# Patient Record
Sex: FEMALE | Race: WHITE | Employment: UNEMPLOYED | URBAN - METROPOLITAN AREA
[De-identification: names, ages, dates, MRNs, and addresses within clinical notes are randomized per-mention and may not be internally consistent; named-entity substitution may affect disease eponyms.]

---

## 2019-05-09 ENCOUNTER — APPOINTMENT (OUTPATIENT)
Dept: CT IMAGING | Age: 24
End: 2019-05-09
Payer: MEDICAID

## 2019-05-09 ENCOUNTER — HOSPITAL ENCOUNTER (EMERGENCY)
Age: 24
Discharge: HOME | End: 2019-05-09
Attending: EMERGENCY MEDICINE
Payer: MEDICAID

## 2019-05-09 VITALS
OXYGEN SATURATION: 100 % | RESPIRATION RATE: 16 BRPM | HEIGHT: 65 IN | HEART RATE: 82 BPM | DIASTOLIC BLOOD PRESSURE: 68 MMHG | SYSTOLIC BLOOD PRESSURE: 97 MMHG | BODY MASS INDEX: 27.49 KG/M2 | WEIGHT: 165 LBS | TEMPERATURE: 98 F

## 2019-05-09 DIAGNOSIS — R10.33 PERIUMBILICAL ABDOMINAL PAIN: Primary | ICD-10-CM

## 2019-05-09 DIAGNOSIS — R11.2 NON-INTRACTABLE VOMITING WITH NAUSEA, UNSPECIFIED VOMITING TYPE: ICD-10-CM

## 2019-05-09 LAB
ALBUMIN SERPL-MCNC: 4.4 G/DL (ref 3.5–5.2)
ALP BLD-CCNC: 79 U/L (ref 35–104)
ALT SERPL-CCNC: 19 U/L (ref 0–32)
ANION GAP SERPL CALCULATED.3IONS-SCNC: 8 MMOL/L (ref 7–16)
AST SERPL-CCNC: 18 U/L (ref 0–31)
BACTERIA: ABNORMAL /HPF
BILIRUB SERPL-MCNC: 0.4 MG/DL (ref 0–1.2)
BILIRUBIN URINE: NEGATIVE
BLOOD, URINE: ABNORMAL
BUN BLDV-MCNC: 15 MG/DL (ref 6–20)
CALCIUM SERPL-MCNC: 9.2 MG/DL (ref 8.6–10.2)
CHLORIDE BLD-SCNC: 103 MMOL/L (ref 98–107)
CLARITY: CLEAR
CO2: 27 MMOL/L (ref 22–29)
COLOR: YELLOW
CREAT SERPL-MCNC: 0.8 MG/DL (ref 0.5–1)
EPITHELIAL CELLS, UA: ABNORMAL /HPF
GFR AFRICAN AMERICAN: >60
GFR NON-AFRICAN AMERICAN: >60 ML/MIN/1.73
GLUCOSE BLD-MCNC: 109 MG/DL (ref 74–99)
GLUCOSE URINE: NEGATIVE MG/DL
HCG, URINE, POC: NEGATIVE
HCT VFR BLD CALC: 41.4 % (ref 34–48)
HEMOGLOBIN: 14.3 G/DL (ref 11.5–15.5)
KETONES, URINE: NEGATIVE MG/DL
LACTIC ACID: 0.9 MMOL/L (ref 0.5–2.2)
LEUKOCYTE ESTERASE, URINE: ABNORMAL
LIPASE: 22 U/L (ref 13–60)
Lab: NORMAL
MCH RBC QN AUTO: 28.5 PG (ref 26–35)
MCHC RBC AUTO-ENTMCNC: 34.5 % (ref 32–34.5)
MCV RBC AUTO: 82.5 FL (ref 80–99.9)
NEGATIVE QC PASS/FAIL: NORMAL
NITRITE, URINE: NEGATIVE
PDW BLD-RTO: 11.9 FL (ref 11.5–15)
PH UA: 7 (ref 5–9)
PLATELET # BLD: 248 E9/L (ref 130–450)
PMV BLD AUTO: 9.8 FL (ref 7–12)
POSITIVE QC PASS/FAIL: NORMAL
POTASSIUM REFLEX MAGNESIUM: 4.5 MMOL/L (ref 3.5–5)
PROTEIN UA: ABNORMAL MG/DL
RBC # BLD: 5.02 E12/L (ref 3.5–5.5)
RBC UA: ABNORMAL /HPF (ref 0–2)
SODIUM BLD-SCNC: 138 MMOL/L (ref 132–146)
SPECIFIC GRAVITY UA: 1.02 (ref 1–1.03)
TOTAL PROTEIN: 7.2 G/DL (ref 6.4–8.3)
UROBILINOGEN, URINE: 0.2 E.U./DL
WBC # BLD: 13.3 E9/L (ref 4.5–11.5)
WBC UA: ABNORMAL /HPF (ref 0–5)

## 2019-05-09 PROCEDURE — 36415 COLL VENOUS BLD VENIPUNCTURE: CPT

## 2019-05-09 PROCEDURE — 80053 COMPREHEN METABOLIC PANEL: CPT

## 2019-05-09 PROCEDURE — 83690 ASSAY OF LIPASE: CPT

## 2019-05-09 PROCEDURE — 85027 COMPLETE CBC AUTOMATED: CPT

## 2019-05-09 PROCEDURE — 96375 TX/PRO/DX INJ NEW DRUG ADDON: CPT

## 2019-05-09 PROCEDURE — 83605 ASSAY OF LACTIC ACID: CPT

## 2019-05-09 PROCEDURE — 6360000002 HC RX W HCPCS: Performed by: STUDENT IN AN ORGANIZED HEALTH CARE EDUCATION/TRAINING PROGRAM

## 2019-05-09 PROCEDURE — 6360000004 HC RX CONTRAST MEDICATION: Performed by: RADIOLOGY

## 2019-05-09 PROCEDURE — 96374 THER/PROPH/DIAG INJ IV PUSH: CPT

## 2019-05-09 PROCEDURE — 99284 EMERGENCY DEPT VISIT MOD MDM: CPT

## 2019-05-09 PROCEDURE — 2580000003 HC RX 258: Performed by: STUDENT IN AN ORGANIZED HEALTH CARE EDUCATION/TRAINING PROGRAM

## 2019-05-09 PROCEDURE — 81001 URINALYSIS AUTO W/SCOPE: CPT

## 2019-05-09 PROCEDURE — 74177 CT ABD & PELVIS W/CONTRAST: CPT

## 2019-05-09 RX ORDER — ONDANSETRON 2 MG/ML
4 INJECTION INTRAMUSCULAR; INTRAVENOUS ONCE
Status: COMPLETED | OUTPATIENT
Start: 2019-05-09 | End: 2019-05-09

## 2019-05-09 RX ORDER — IBUPROFEN 800 MG/1
800 TABLET ORAL 2 TIMES DAILY PRN
Qty: 10 TABLET | Refills: 0 | Status: SHIPPED | OUTPATIENT
Start: 2019-05-09 | End: 2021-09-13

## 2019-05-09 RX ORDER — FENTANYL CITRATE 50 UG/ML
50 INJECTION, SOLUTION INTRAMUSCULAR; INTRAVENOUS ONCE
Status: COMPLETED | OUTPATIENT
Start: 2019-05-09 | End: 2019-05-09

## 2019-05-09 RX ORDER — ONDANSETRON 4 MG/1
4 TABLET, ORALLY DISINTEGRATING ORAL EVERY 8 HOURS PRN
Qty: 10 TABLET | Refills: 0 | Status: SHIPPED | OUTPATIENT
Start: 2019-05-09 | End: 2021-07-12

## 2019-05-09 RX ORDER — 0.9 % SODIUM CHLORIDE 0.9 %
1000 INTRAVENOUS SOLUTION INTRAVENOUS ONCE
Status: COMPLETED | OUTPATIENT
Start: 2019-05-09 | End: 2019-05-09

## 2019-05-09 RX ADMIN — SODIUM CHLORIDE 1000 ML: 9 INJECTION, SOLUTION INTRAVENOUS at 09:18

## 2019-05-09 RX ADMIN — ONDANSETRON 4 MG: 2 INJECTION INTRAMUSCULAR; INTRAVENOUS at 09:19

## 2019-05-09 RX ADMIN — IOPAMIDOL 80 ML: 755 INJECTION, SOLUTION INTRAVENOUS at 10:12

## 2019-05-09 RX ADMIN — FENTANYL CITRATE 50 MCG: 50 INJECTION INTRAMUSCULAR; INTRAVENOUS at 09:19

## 2019-05-09 ASSESSMENT — ENCOUNTER SYMPTOMS
EYE REDNESS: 0
CONSTIPATION: 0
NAUSEA: 1
SHORTNESS OF BREATH: 0
TROUBLE SWALLOWING: 0
BLOOD IN STOOL: 0
COUGH: 0
SORE THROAT: 0
DIARRHEA: 0
HEMATOCHEZIA: 0
ABDOMINAL PAIN: 1
VOMITING: 1
BACK PAIN: 0
HEMATEMESIS: 0

## 2019-05-09 ASSESSMENT — PAIN SCALES - GENERAL
PAINLEVEL_OUTOF10: 8
PAINLEVEL_OUTOF10: 1
PAINLEVEL_OUTOF10: 6

## 2019-05-09 ASSESSMENT — PAIN DESCRIPTION - FREQUENCY: FREQUENCY: INTERMITTENT

## 2019-05-09 ASSESSMENT — PAIN DESCRIPTION - LOCATION: LOCATION: ABDOMEN

## 2019-05-09 ASSESSMENT — PAIN DESCRIPTION - DESCRIPTORS: DESCRIPTORS: ACHING

## 2019-05-09 ASSESSMENT — PAIN DESCRIPTION - PAIN TYPE: TYPE: ACUTE PAIN

## 2019-05-09 ASSESSMENT — PAIN DESCRIPTION - ORIENTATION: ORIENTATION: RIGHT

## 2019-05-09 NOTE — ED PROVIDER NOTES
The patient is a 23-year-old female presents to the emergency department complaining of abdominal pain. The patient states her abdominal pain started at 3:00 this morning, and woke her from sleep. Her pain is primarily periumbilical and right lower quadrant. She also has been experiencing nausea and vomiting several times this morning. The patient does not have any history of abdominal surgeries. The patient has a nexplanon, and her last menstrual period was approximately two weeks ago. The patient did notice some spotting this morning. The patient denies any fever, chills, hematemesis, hematochezia, melena, diarrhea, dysuria, hematuria, vaginal discharge,  recent hospitalization, recent illness, or other acute symptoms or concerns. The history is provided by the patient. Abdominal Pain   Pain location:  Periumbilical  Pain quality: aching    Pain radiates to:  Does not radiate  Pain severity:  Mild  Onset quality:  Sudden  Timing:  Constant  Progression:  Unchanged  Chronicity:  New  Context: awakening from sleep    Context: not diet changes, not eating, not previous surgeries, not recent illness, not recent travel, not sick contacts, not suspicious food intake and not trauma    Relieved by:  Nothing  Worsened by:  Nothing  Ineffective treatments:  NSAIDs  Associated symptoms: nausea and vomiting    Associated symptoms: no anorexia, no chest pain, no chills, no constipation, no cough, no diarrhea, no dysuria, no fatigue, no fever, no hematemesis, no hematochezia, no hematuria, no melena, no shortness of breath, no sore throat and no vaginal discharge    Risk factors: no aspirin use, not elderly, has not had multiple surgeries, not obese and no recent hospitalization        Review of Systems   Constitutional: Negative for chills, fatigue and fever. HENT: Negative for sore throat and trouble swallowing. Eyes: Negative for redness and visual disturbance.    Respiratory: Negative for cough and shortness of breath. Cardiovascular: Negative for chest pain and leg swelling. Gastrointestinal: Positive for abdominal pain, nausea and vomiting. Negative for anorexia, blood in stool, constipation, diarrhea, hematemesis, hematochezia and melena. Genitourinary: Negative for dysuria, flank pain, hematuria and vaginal discharge. Musculoskeletal: Negative for back pain. Skin: Negative for rash and wound. Neurological: Negative for dizziness, light-headedness, numbness and headaches. Physical Exam   Constitutional: She is oriented to person, place, and time. She appears well-developed and well-nourished. HENT:   Head: Normocephalic and atraumatic. Eyes: Conjunctivae and EOM are normal.   Neck: Normal range of motion. Neck supple. Cardiovascular: Normal rate, regular rhythm and normal heart sounds. No murmur heard. Pulmonary/Chest: Effort normal and breath sounds normal. No respiratory distress. She has no wheezes. She has no rales. Abdominal: Soft. Bowel sounds are normal. She exhibits no distension. There is tenderness in the right lower quadrant and periumbilical area. There is no rigidity, no rebound and no guarding. Musculoskeletal: She exhibits no edema. Neurological: She is alert and oriented to person, place, and time. No sensory deficit. Coordination normal.   Skin: Skin is warm and dry. Nursing note and vitals reviewed. Procedures    MDM  Number of Diagnoses or Management Options  Non-intractable vomiting with nausea, unspecified vomiting type:   Periumbilical abdominal pain:   Diagnosis management comments: The patient is a 69-year-old female who presents the emergency department complaining of abdominal pain, nausea, and vomiting. On examination the patient has tenderness to palpation of the periumbilical and right lower quadrant regions.   She has mild leukocytosis of 13.3, electrolytes are within normal limits, lipase is normal, urinalysis not evident of any acute infection, lactic acid is within normal limits, pregnancy test negative, CT abdomen is not evident of any acute findings. Treating patient with 1 L of IV fluids, antiemetic, and pain medication. The patient is feeling much better and able to tolerate food and drink in the ED prior to discharge. Discussed in depth with patient the need to follow up with her Bandar Tatyana and family doctor. Recommended her to return to the emergency department if she experiences right lower quadrant localized pain, fever, intractable vomiting, dehydration, or other acute symptoms or concerns. Patient verbalized understanding and agreement to plan of care and discharge instructions.                  --------------------------------------------- PAST HISTORY ---------------------------------------------  Past Medical History:  has a past medical history of Anemia, Asthma, and Postpartum depression. Past Surgical History:  has a past surgical history that includes Eye surgery (1997). Social History:  reports that she quit smoking about 3 years ago. Her smoking use included cigarettes. She has never used smokeless tobacco. She reports that she does not drink alcohol or use drugs. Family History: family history includes Arthritis in her maternal grandmother; Asthma in her maternal grandmother; Cancer in her maternal grandfather; Diabetes in her paternal grandfather; High Blood Pressure in her maternal grandfather; High Cholesterol in her maternal grandmother and paternal grandfather; Nathaneil Larkin / [place] in her maternal grandmother and mother; Stroke in her maternal grandfather and maternal grandmother; Substance Abuse in her maternal grandfather. The patients home medications have been reviewed. Allergies: Patient has no known allergies.     -------------------------------------------------- RESULTS -------------------------------------------------  Labs:  Results for orders placed or performed during the hospital encounter of 05/09/19   Urinalysis   Result Value Ref Range    Color, UA Yellow Straw/Yellow    Clarity, UA Clear Clear    Glucose, Ur Negative Negative mg/dL    Bilirubin Urine Negative Negative    Ketones, Urine Negative Negative mg/dL    Specific Gravity, UA 1.020 1.005 - 1.030    Blood, Urine LARGE (A) Negative    pH, UA 7.0 5.0 - 9.0    Protein, UA TRACE Negative mg/dL    Urobilinogen, Urine 0.2 <2.0 E.U./dL    Nitrite, Urine Negative Negative    Leukocyte Esterase, Urine TRACE (A) Negative   CBC   Result Value Ref Range    WBC 13.3 (H) 4.5 - 11.5 E9/L    RBC 5.02 3.50 - 5.50 E12/L    Hemoglobin 14.3 11.5 - 15.5 g/dL    Hematocrit 41.4 34.0 - 48.0 %    MCV 82.5 80.0 - 99.9 fL    MCH 28.5 26.0 - 35.0 pg    MCHC 34.5 32.0 - 34.5 %    RDW 11.9 11.5 - 15.0 fL    Platelets 145 764 - 297 E9/L    MPV 9.8 7.0 - 12.0 fL   Comprehensive Metabolic Panel w/ Reflex to MG   Result Value Ref Range    Sodium 138 132 - 146 mmol/L    Potassium reflex Magnesium 4.5 3.5 - 5.0 mmol/L    Chloride 103 98 - 107 mmol/L    CO2 27 22 - 29 mmol/L    Anion Gap 8 7 - 16 mmol/L    Glucose 109 (H) 74 - 99 mg/dL    BUN 15 6 - 20 mg/dL    CREATININE 0.8 0.5 - 1.0 mg/dL    GFR Non-African American >60 >=60 mL/min/1.73    GFR African American >60     Calcium 9.2 8.6 - 10.2 mg/dL    Total Protein 7.2 6.4 - 8.3 g/dL    Alb 4.4 3.5 - 5.2 g/dL    Total Bilirubin 0.4 0.0 - 1.2 mg/dL    Alkaline Phosphatase 79 35 - 104 U/L    ALT 19 0 - 32 U/L    AST 18 0 - 31 U/L   Lactic Acid, Plasma   Result Value Ref Range    Lactic Acid 0.9 0.5 - 2.2 mmol/L   Lipase   Result Value Ref Range    Lipase 22 13 - 60 U/L   Microscopic Urinalysis   Result Value Ref Range    WBC, UA 2-5 0 - 5 /HPF    RBC, UA 5-10 (A) 0 - 2 /HPF    Epi Cells RARE /HPF    Bacteria, UA FEW (A) /HPF   POC Pregnancy Urine Qual   Result Value Ref Range    HCG, Urine, POC Negative Negative    Lot Number [id number]     Positive QC Pass/Fail Pass     Negative QC Pass/Fail Pass Radiology:  CT ABDOMEN PELVIS W IV CONTRAST Additional Contrast? None   Final Result   No acute finding.          ------------------------- NURSING NOTES AND VITALS REVIEWED ---------------------------  Date / Time Roomed:  5/9/2019  8:50 AM  ED Bed Assignment:  16/16    The nursing notes within the ED encounter and vital signs as below have been reviewed. BP 97/68   Pulse 82   Temp 98 °F (36.7 °C) (Oral)   Resp 16   Ht 5' 5\" (1.651 m)   Wt 165 lb (74.8 kg)   LMP 04/25/2019 (Approximate)   SpO2 100%   BMI 27.46 kg/m²   Oxygen Saturation Interpretation: Normal      ------------------------------------------ PROGRESS NOTES ------------------------------------------  12:25 PM  I have spoken with the patient and discussed todays results, in addition to providing specific details for the plan of care and counseling regarding the diagnosis and prognosis. Their questions are answered at this time and they are agreeable with the plan. I discussed at length with them reasons for immediate return here for re evaluation. They will followup with their primary care physician by calling their office tomorrow. --------------------------------- ADDITIONAL PROVIDER NOTES ---------------------------------  At this time the patient is without objective evidence of an acute process requiring hospitalization or inpatient management. They have remained hemodynamically stable throughout their entire ED visit and are stable for discharge with outpatient follow-up. The plan has been discussed in detail and they are aware of the specific conditions for emergent return, as well as the importance of follow-up.       Discharge Medication List as of 5/9/2019 10:57 AM      START taking these medications    Details   ibuprofen (ADVIL;MOTRIN) 800 MG tablet Take 1 tablet by mouth 2 times daily as needed for Pain, Disp-10 tablet, R-0Print      ondansetron (ZOFRAN ODT) 4 MG disintegrating tablet Take 1 tablet by mouth every 8 hours as needed for Nausea or Vomiting, Disp-10 tablet, R-0Print             Diagnosis:  1. Periumbilical abdominal pain    2. Non-intractable vomiting with nausea, unspecified vomiting type        Disposition:  Patient's disposition: Discharge to home  Patient's condition is stable.            Aramis Campos,   Resident  05/09/19 7641

## 2021-04-02 ENCOUNTER — HOSPITAL ENCOUNTER (EMERGENCY)
Age: 26
Discharge: HOME | End: 2021-04-02
Attending: EMERGENCY MEDICINE
Payer: MEDICAID

## 2021-04-02 ENCOUNTER — APPOINTMENT (OUTPATIENT)
Dept: ULTRASOUND IMAGING | Age: 26
End: 2021-04-02
Payer: MEDICAID

## 2021-04-02 VITALS
OXYGEN SATURATION: 98 % | SYSTOLIC BLOOD PRESSURE: 96 MMHG | BODY MASS INDEX: 30.12 KG/M2 | WEIGHT: 170 LBS | RESPIRATION RATE: 18 BRPM | DIASTOLIC BLOOD PRESSURE: 55 MMHG | TEMPERATURE: 97.8 F | HEIGHT: 63 IN | HEART RATE: 94 BPM

## 2021-04-02 DIAGNOSIS — O20.0 THREATENED MISCARRIAGE IN EARLY PREGNANCY: Primary | ICD-10-CM

## 2021-04-02 DIAGNOSIS — O20.9 VAGINAL BLEEDING BEFORE 22 WEEKS GESTATION: ICD-10-CM

## 2021-04-02 DIAGNOSIS — O20.9 BLEEDING IN EARLY PREGNANCY: ICD-10-CM

## 2021-04-02 LAB
ABO/RH: NORMAL
ALBUMIN SERPL-MCNC: 3.8 G/DL (ref 3.5–5.2)
ALP BLD-CCNC: 53 U/L (ref 35–104)
ALT SERPL-CCNC: 29 U/L (ref 0–32)
ANION GAP SERPL CALCULATED.3IONS-SCNC: 11 MMOL/L (ref 7–16)
ANTIBODY SCREEN: NORMAL
AST SERPL-CCNC: 17 U/L (ref 0–31)
BACTERIA: NORMAL /HPF
BASOPHILS ABSOLUTE: 0.02 E9/L (ref 0–0.2)
BASOPHILS RELATIVE PERCENT: 0.3 % (ref 0–2)
BILIRUB SERPL-MCNC: <0.2 MG/DL (ref 0–1.2)
BILIRUBIN URINE: NEGATIVE
BLOOD, URINE: ABNORMAL
BUN BLDV-MCNC: 8 MG/DL (ref 6–20)
CALCIUM SERPL-MCNC: 8.7 MG/DL (ref 8.6–10.2)
CHLORIDE BLD-SCNC: 104 MMOL/L (ref 98–107)
CLARITY: CLEAR
CO2: 23 MMOL/L (ref 22–29)
COLOR: YELLOW
CREAT SERPL-MCNC: 0.6 MG/DL (ref 0.5–1)
EOSINOPHILS ABSOLUTE: 0.11 E9/L (ref 0.05–0.5)
EOSINOPHILS RELATIVE PERCENT: 1.6 % (ref 0–6)
GFR AFRICAN AMERICAN: >60
GFR NON-AFRICAN AMERICAN: >60 ML/MIN/1.73
GLUCOSE BLD-MCNC: 87 MG/DL (ref 74–99)
GLUCOSE URINE: NEGATIVE MG/DL
GONADOTROPIN, CHORIONIC (HCG) QUANT: ABNORMAL MIU/ML
HCT VFR BLD CALC: 33.4 % (ref 34–48)
HEMOGLOBIN: 11.4 G/DL (ref 11.5–15.5)
IMMATURE GRANULOCYTES #: 0.02 E9/L
IMMATURE GRANULOCYTES %: 0.3 % (ref 0–5)
KETONES, URINE: NEGATIVE MG/DL
LEUKOCYTE ESTERASE, URINE: NEGATIVE
LYMPHOCYTES ABSOLUTE: 1.53 E9/L (ref 1.5–4)
LYMPHOCYTES RELATIVE PERCENT: 22 % (ref 20–42)
MCH RBC QN AUTO: 29 PG (ref 26–35)
MCHC RBC AUTO-ENTMCNC: 34.1 % (ref 32–34.5)
MCV RBC AUTO: 85 FL (ref 80–99.9)
MONOCYTES ABSOLUTE: 0.49 E9/L (ref 0.1–0.95)
MONOCYTES RELATIVE PERCENT: 7.1 % (ref 2–12)
NEUTROPHILS ABSOLUTE: 4.78 E9/L (ref 1.8–7.3)
NEUTROPHILS RELATIVE PERCENT: 68.7 % (ref 43–80)
NITRITE, URINE: NEGATIVE
PDW BLD-RTO: 12.6 FL (ref 11.5–15)
PH UA: 6.5 (ref 5–9)
PLATELET # BLD: 200 E9/L (ref 130–450)
PMV BLD AUTO: 9.9 FL (ref 7–12)
POTASSIUM SERPL-SCNC: 3.6 MMOL/L (ref 3.5–5)
PROTEIN UA: NEGATIVE MG/DL
RBC # BLD: 3.93 E12/L (ref 3.5–5.5)
RBC UA: NORMAL /HPF (ref 0–2)
RHIG LOT NUMBER: NORMAL
SODIUM BLD-SCNC: 138 MMOL/L (ref 132–146)
SPECIFIC GRAVITY UA: 1.02 (ref 1–1.03)
TOTAL PROTEIN: 6.3 G/DL (ref 6.4–8.3)
UROBILINOGEN, URINE: 0.2 E.U./DL
WBC # BLD: 7 E9/L (ref 4.5–11.5)
WBC UA: NORMAL /HPF (ref 0–5)

## 2021-04-02 PROCEDURE — 99285 EMERGENCY DEPT VISIT HI MDM: CPT

## 2021-04-02 PROCEDURE — 2580000003 HC RX 258: Performed by: EMERGENCY MEDICINE

## 2021-04-02 PROCEDURE — 86850 RBC ANTIBODY SCREEN: CPT

## 2021-04-02 PROCEDURE — 96372 THER/PROPH/DIAG INJ SC/IM: CPT

## 2021-04-02 PROCEDURE — 85025 COMPLETE CBC W/AUTO DIFF WBC: CPT

## 2021-04-02 PROCEDURE — 76802 OB US < 14 WKS ADDL FETUS: CPT

## 2021-04-02 PROCEDURE — 36415 COLL VENOUS BLD VENIPUNCTURE: CPT

## 2021-04-02 PROCEDURE — 76815 OB US LIMITED FETUS(S): CPT

## 2021-04-02 PROCEDURE — 81001 URINALYSIS AUTO W/SCOPE: CPT

## 2021-04-02 PROCEDURE — 84702 CHORIONIC GONADOTROPIN TEST: CPT

## 2021-04-02 PROCEDURE — 86901 BLOOD TYPING SEROLOGIC RH(D): CPT

## 2021-04-02 PROCEDURE — 86900 BLOOD TYPING SEROLOGIC ABO: CPT

## 2021-04-02 PROCEDURE — 80053 COMPREHEN METABOLIC PANEL: CPT

## 2021-04-02 PROCEDURE — 6370000000 HC RX 637 (ALT 250 FOR IP): Performed by: EMERGENCY MEDICINE

## 2021-04-02 PROCEDURE — 6360000002 HC RX W HCPCS: Performed by: EMERGENCY MEDICINE

## 2021-04-02 RX ORDER — 0.9 % SODIUM CHLORIDE 0.9 %
1000 INTRAVENOUS SOLUTION INTRAVENOUS ONCE
Status: COMPLETED | OUTPATIENT
Start: 2021-04-02 | End: 2021-04-02

## 2021-04-02 RX ORDER — ACETAMINOPHEN 325 MG/1
650 TABLET ORAL ONCE
Status: COMPLETED | OUTPATIENT
Start: 2021-04-02 | End: 2021-04-02

## 2021-04-02 RX ADMIN — SODIUM CHLORIDE 1000 ML: 9 INJECTION, SOLUTION INTRAVENOUS at 05:37

## 2021-04-02 RX ADMIN — ACETAMINOPHEN 650 MG: 325 TABLET, FILM COATED ORAL at 06:45

## 2021-04-02 RX ADMIN — HUMAN RHO(D) IMMUNE GLOBULIN 300 MCG: 300 INJECTION, SOLUTION INTRAMUSCULAR at 07:30

## 2021-04-02 ASSESSMENT — ENCOUNTER SYMPTOMS
SINUS PRESSURE: 0
ABDOMINAL DISTENTION: 0
SHORTNESS OF BREATH: 0
BACK PAIN: 0
NAUSEA: 0
SORE THROAT: 0
COUGH: 0
VOMITING: 0
DIARRHEA: 0
EYE DISCHARGE: 0
EYE REDNESS: 0
WHEEZING: 0
EYE PAIN: 0

## 2021-04-02 ASSESSMENT — PAIN DESCRIPTION - DESCRIPTORS: DESCRIPTORS: CRAMPING

## 2021-04-02 NOTE — ED NOTES
Pt transported to [address] [place] [address] [address] at this time via cart.       America Leija, MARIA T  04/02/21 0379

## 2021-04-02 NOTE — ED NOTES
U/S called. Spouses are not to be in room. Tech. Had not been called in for 0630. Pt. Under 12 weeks. Bladder must be full. Pitcher of water given to pt. Informed both pt. And spouse to call RN when pt. Needs to void.      Edy Langston RN  04/02/21 4704

## 2021-04-02 NOTE — ED NOTES
U/S aware of test.  Will be here around 0630. Pt. Aware.  requests being in room for ultrasound. Will relay to magan.      Prema Austin RN  04/02/21 2375

## 2021-04-02 NOTE — ED PROVIDER NOTES
Patient is a 32 y/o female who presents to the ED with vaginal bleeding. Patient states that she had onset of vaginal bleeding approximately one hour prior to arrival. She states that she soaked a panty liner with blood, however, it has since slowed down. She denies any passage of blood clots. She denies any abdominal pain. Patient is currently approximately 12 weeks pregnant with a twin gestation. This is her fourth pregnancy with no previous miscarriages. Review of Systems   Constitutional: Negative for chills and fever. HENT: Negative for ear pain, sinus pressure and sore throat. Eyes: Negative for pain, discharge and redness. Respiratory: Negative for cough, shortness of breath and wheezing. Cardiovascular: Negative for chest pain. Gastrointestinal: Negative for abdominal distention, diarrhea, nausea and vomiting. Genitourinary: Positive for vaginal bleeding. Negative for dysuria and frequency. Musculoskeletal: Negative for arthralgias and back pain. Skin: Negative for rash and wound. Neurological: Negative for weakness and headaches. Hematological: Negative for adenopathy. All other systems reviewed and are negative. Physical Exam  Vitals signs and nursing note reviewed. Constitutional:       General: She is not in acute distress. HENT:      Head: Normocephalic and atraumatic. Right Ear: External ear normal.      Left Ear: External ear normal.      Nose: Nose normal.      Mouth/Throat:      Mouth: Mucous membranes are moist.   Eyes:      Conjunctiva/sclera: Conjunctivae normal.      Pupils: Pupils are equal, round, and reactive to light. Neck:      Musculoskeletal: Normal range of motion and neck supple. Cardiovascular:      Rate and Rhythm: Regular rhythm. Tachycardia present. Heart sounds: No murmur. Pulmonary:      Effort: Pulmonary effort is normal. No respiratory distress. Breath sounds: Normal breath sounds. No stridor.  No wheezing, rhonchi or rales. Abdominal:      General: Bowel sounds are normal. There is no distension. Palpations: Abdomen is soft. Tenderness: There is no abdominal tenderness. There is no guarding. Musculoskeletal: Normal range of motion. General: No swelling or tenderness. Right lower leg: Normal. She exhibits no tenderness and no swelling. No edema. Left lower leg: Normal. She exhibits no tenderness and no swelling. No edema. Skin:     General: Skin is warm and dry. Neurological:      Mental Status: She is alert and oriented to person, place, and time. Procedures     MDM     ED Course as of Apr 02 0955 Fri Apr 02, 2021 0703 7:03 AM EDT  I received this patient at sign out from Dr. Eusebia Ackerman. I have discussed the patient's initial exam, treatment and plan of care with the out going physician. I have introduced my self to the patient / family and have answered their questions to this point. I have examined the patient myself and reviewed ordered tests / medications and  reviewed any available results to this point. If a resident is involved in the Emergency Department care, I have discussed my findings and plan with them as well. [NC]   [id number] Patient laying in the bed in no distress,  at bedside. She states she is feeling well, hungry and ready to go home. Reports the previous bleeding has significantly proved and pretty much resolved. Updated ultrasound results, they already knew they are having twins and have high risk follow-up and will call her OB/GYN on Monday. [NC]      ED Course User Index  [NC] Craig Clemens DO          7:00 AM EDT  I have signed this patient out to the oncoming physician, Dr. Louis Jacobson.  I have discussed the patient's initial exam, treatment and plan of care with the on coming physician. I have notified the patient / family of the change in treating physician and answered their questions to this point.       ED Course as of Apr 02 0956 The patients home medications have been reviewed. Allergies: Patient has no known allergies.     -------------------------------------------------- RESULTS -------------------------------------------------  Labs:  Results for orders placed or performed during the hospital encounter of 04/02/21   CBC auto differential   Result Value Ref Range    WBC 7.0 4.5 - 11.5 E9/L    RBC 3.93 3.50 - 5.50 E12/L    Hemoglobin 11.4 (L) 11.5 - 15.5 g/dL    Hematocrit 33.4 (L) 34.0 - 48.0 %    MCV 85.0 80.0 - 99.9 fL    MCH 29.0 26.0 - 35.0 pg    MCHC 34.1 32.0 - 34.5 %    RDW 12.6 11.5 - 15.0 fL    Platelets 620 655 - 935 E9/L    MPV 9.9 7.0 - 12.0 fL    Neutrophils % 68.7 43.0 - 80.0 %    Immature Granulocytes % 0.3 0.0 - 5.0 %    Lymphocytes % 22.0 20.0 - 42.0 %    Monocytes % 7.1 2.0 - 12.0 %    Eosinophils % 1.6 0.0 - 6.0 %    Basophils % 0.3 0.0 - 2.0 %    Neutrophils Absolute 4.78 1.80 - 7.30 E9/L    Immature Granulocytes # 0.02 E9/L    Lymphocytes Absolute 1.53 1.50 - 4.00 E9/L    Monocytes Absolute 0.49 0.10 - 0.95 E9/L    Eosinophils Absolute 0.11 0.05 - 0.50 E9/L    Basophils Absolute 0.02 0.00 - 0.20 E9/L   Comprehensive Metabolic Panel   Result Value Ref Range    Sodium 138 132 - 146 mmol/L    Potassium 3.6 3.5 - 5.0 mmol/L    Chloride 104 98 - 107 mmol/L    CO2 23 22 - 29 mmol/L    Anion Gap 11 7 - 16 mmol/L    Glucose 87 74 - 99 mg/dL    BUN 8 6 - 20 mg/dL    CREATININE 0.6 0.5 - 1.0 mg/dL    GFR Non-African American >60 >=60 mL/min/1.73    GFR African American >60     Calcium 8.7 8.6 - 10.2 mg/dL    Total Protein 6.3 (L) 6.4 - 8.3 g/dL    Albumin 3.8 3.5 - 5.2 g/dL    Total Bilirubin <0.2 0.0 - 1.2 mg/dL    Alkaline Phosphatase 53 35 - 104 U/L    ALT 29 0 - 32 U/L    AST 17 0 - 31 U/L   hCG, quantitative, pregnancy   Result Value Ref Range    hCG Quant 56234.0 (H) <10 mIU/mL   Urinalysis   Result Value Ref Range    Color, UA Yellow Straw/Yellow    Clarity, UA Clear Clear    Glucose, Ur Negative Negative mg/dL Bilirubin Urine Negative Negative    Ketones, Urine Negative Negative mg/dL    Specific Gravity, UA 1.025 1.005 - 1.030    Blood, Urine MODERATE (A) Negative    pH, UA 6.5 5.0 - 9.0    Protein, UA Negative Negative mg/dL    Urobilinogen, Urine 0.2 <2.0 E.U./dL    Nitrite, Urine Negative Negative    Leukocyte Esterase, Urine Negative Negative   Microscopic Urinalysis   Result Value Ref Range    WBC, UA 0-1 0 - 5 /HPF    RBC, UA 0-1 0 - 2 /HPF    Bacteria, UA NONE SEEN None Seen /HPF   RH IMMUNE GLOBULIN   Result Value Ref Range    RHIG LOT NUMBER       RhImmuneGlobulin    [id number]          issued       1 vial  04/02/21 07:21   ABO/RH   Result Value Ref Range    ABO/Rh A NEG    LAKIA TEST, INDIRECT, QUALITATIVE   Result Value Ref Range    Antibody Screen NEG        Radiology:  US OB 1 OR MORE FETUS LIMITED   Final Result   1. Presence of a monochorionic/monoamniotic viable intrauterine pregnancy   with 11 weeks and 6 days of development for the Twin A, and the 12 weeks and   2 days of development for the twin B.      2.  Fetal heart activity: Twin A 161 BPM; twin B 153 BPM.      3.  Single anterior placenta not covering the internal cervical os. US OB LESS THAN 14 WEEKS ADDITIONAL FETUS   Final Result   1. Presence of a monochorionic/monoamniotic viable intrauterine pregnancy   with 11 weeks and 6 days of development for the Twin A, and the 12 weeks and   2 days of development for the twin B.      2.  Fetal heart activity: Twin A 161 BPM; twin B 153 BPM.      3.  Single anterior placenta not covering the internal cervical os.             ------------------------- NURSING NOTES AND VITALS REVIEWED ---------------------------  Date / Time Roomed:  4/2/2021  4:51 AM  ED Bed Assignment:  09/09    The nursing notes within the ED encounter and vital signs as below have been reviewed.    BP (!) 96/55   Pulse 94   Temp 97.8 °F (36.6 °C) (Oral)   Resp 18   Ht 5' 3\" (1.6 m)   Wt 170 lb (77.1 kg)   LMP 12/26/2020 (Exact Date)   SpO2 98%   BMI 30.11 kg/m²   Oxygen Saturation Interpretation: Normal      ------------------------------------------ PROGRESS NOTES ------------------------------------------  I have spoken with the patient and discussed todays results, in addition to providing specific details for the plan of care and counseling regarding the diagnosis and prognosis. Their questions are answered at this time and they are agreeable with the plan. I discussed at length with them reasons for immediate return here for re evaluation. They will followup with primary care by calling their office tomorrow. --------------------------------- ADDITIONAL PROVIDER NOTES ---------------------------------  At this time the patient is without objective evidence of an acute process requiring hospitalization or inpatient management. They have remained hemodynamically stable throughout their entire ED visit and are stable for discharge with outpatient follow-up. The plan has been discussed in detail and they are aware of the specific conditions for emergent return, as well as the importance of follow-up. New Prescriptions    No medications on file       Diagnosis:  1. Threatened miscarriage in early pregnancy    2. Bleeding in early pregnancy    3. Vaginal bleeding before 22 weeks gestation        Disposition:  Patient's disposition: Discharge to home  Patient's condition is stable.          [facility], DO  04/02/21 4480

## 2021-04-19 ENCOUNTER — INITIAL PRENATAL (OUTPATIENT)
Dept: OBGYN CLINIC | Age: 26
End: 2021-04-19
Payer: MEDICAID

## 2021-04-19 ENCOUNTER — ANCILLARY PROCEDURE (OUTPATIENT)
Dept: OBGYN CLINIC | Age: 26
End: 2021-04-19
Payer: MEDICAID

## 2021-04-19 VITALS
BODY MASS INDEX: 30.11 KG/M2 | DIASTOLIC BLOOD PRESSURE: 76 MMHG | SYSTOLIC BLOOD PRESSURE: 114 MMHG | HEART RATE: 80 BPM | WEIGHT: 170 LBS

## 2021-04-19 DIAGNOSIS — O30.042 DICHORIONIC DIAMNIOTIC TWIN PREGNANCY IN SECOND TRIMESTER: Primary | ICD-10-CM

## 2021-04-19 LAB
GLUCOSE URINE, POC: NORMAL
PROTEIN UA: NEGATIVE

## 2021-04-19 PROCEDURE — 99213 OFFICE O/P EST LOW 20 MIN: CPT | Performed by: OBSTETRICS & GYNECOLOGY

## 2021-04-19 PROCEDURE — 81002 URINALYSIS NONAUTO W/O SCOPE: CPT | Performed by: OBSTETRICS & GYNECOLOGY

## 2021-04-19 PROCEDURE — 76810 OB US >/= 14 WKS ADDL FETUS: CPT | Performed by: OBSTETRICS & GYNECOLOGY

## 2021-04-19 PROCEDURE — 99203 OFFICE O/P NEW LOW 30 MIN: CPT

## 2021-04-19 PROCEDURE — 76805 OB US >/= 14 WKS SNGL FETUS: CPT | Performed by: OBSTETRICS & GYNECOLOGY

## 2021-04-19 RX ORDER — ASPIRIN 81 MG/1
81 TABLET, CHEWABLE ORAL DAILY
COMMUNITY
End: 2022-02-05 | Stop reason: ALTCHOICE

## 2021-04-19 NOTE — PROGRESS NOTES
Patient is here today for OB new patient. BRONSON 10/15/2021, TWINS. Patient states no current issues. Patient denies vaginal bleeding, discharge and abdominal cramping.

## 2021-05-19 ENCOUNTER — ROUTINE PRENATAL (OUTPATIENT)
Dept: OBGYN CLINIC | Age: 26
End: 2021-05-19
Payer: MEDICAID

## 2021-05-19 ENCOUNTER — ANCILLARY PROCEDURE (OUTPATIENT)
Dept: OBGYN CLINIC | Age: 26
End: 2021-05-19
Payer: MEDICAID

## 2021-05-19 VITALS
WEIGHT: 174.4 LBS | SYSTOLIC BLOOD PRESSURE: 101 MMHG | HEART RATE: 104 BPM | DIASTOLIC BLOOD PRESSURE: 75 MMHG | BODY MASS INDEX: 30.89 KG/M2

## 2021-05-19 DIAGNOSIS — O35.AXX1 PREGNANCY COMPLICATED BY FETAL CLEFT LIP, FETUS 1: ICD-10-CM

## 2021-05-19 DIAGNOSIS — Z3A.18 18 WEEKS GESTATION OF PREGNANCY: ICD-10-CM

## 2021-05-19 DIAGNOSIS — F12.10 MILD TETRAHYDROCANNABINOL (THC) ABUSE: ICD-10-CM

## 2021-05-19 DIAGNOSIS — O30.042 DICHORIONIC DIAMNIOTIC TWIN PREGNANCY IN SECOND TRIMESTER: Primary | ICD-10-CM

## 2021-05-19 PROBLEM — O30.049 DICHORIONIC DIAMNIOTIC TWIN GESTATION: Status: ACTIVE | Noted: 2021-05-19

## 2021-05-19 LAB
GLUCOSE URINE, POC: NORMAL
PROTEIN UA: POSITIVE

## 2021-05-19 PROCEDURE — G8427 DOCREV CUR MEDS BY ELIG CLIN: HCPCS | Performed by: OBSTETRICS & GYNECOLOGY

## 2021-05-19 PROCEDURE — G8419 CALC BMI OUT NRM PARAM NOF/U: HCPCS | Performed by: OBSTETRICS & GYNECOLOGY

## 2021-05-19 PROCEDURE — 99213 OFFICE O/P EST LOW 20 MIN: CPT | Performed by: OBSTETRICS & GYNECOLOGY

## 2021-05-19 PROCEDURE — 76812 OB US DETAILED ADDL FETUS: CPT | Performed by: OBSTETRICS & GYNECOLOGY

## 2021-05-19 PROCEDURE — 81002 URINALYSIS NONAUTO W/O SCOPE: CPT | Performed by: OBSTETRICS & GYNECOLOGY

## 2021-05-19 PROCEDURE — 76817 TRANSVAGINAL US OBSTETRIC: CPT | Performed by: OBSTETRICS & GYNECOLOGY

## 2021-05-19 PROCEDURE — 1036F TOBACCO NON-USER: CPT | Performed by: OBSTETRICS & GYNECOLOGY

## 2021-05-19 PROCEDURE — 76811 OB US DETAILED SNGL FETUS: CPT | Performed by: OBSTETRICS & GYNECOLOGY

## 2021-05-19 RX ORDER — METOCLOPRAMIDE 10 MG/1
TABLET ORAL
Status: ON HOLD | COMMUNITY
Start: 2021-05-18 | End: 2021-10-03 | Stop reason: HOSPADM

## 2021-05-19 RX ORDER — FAMOTIDINE 20 MG/1
TABLET, FILM COATED ORAL
COMMUNITY
Start: 2021-05-18 | End: 2022-02-05 | Stop reason: ALTCHOICE

## 2021-05-19 NOTE — PATIENT INSTRUCTIONS
calcium. If you are worried that you are not getting enough, talk to your doctor. · Exercise every day, and stretch your legs before bed. · Take a warm bath before bed, and try leg warmers at night. Where can you learn more? Go to https://raheem.healthDevotee. org and sign in to your [employer] account. Enter T256 in the [facility] box to learn more about \"Weeks 18 to 22 of Your Pregnancy: Care Instructions. \"     If you do not have an account, please click on the \"Sign Up Now\" link. Current as of: October 8, 2020               Content Version: 12.8  © 2673-6024 [employer]. Care instructions adapted under license by [facility] ([facility]). If you have questions about a medical condition or this instruction, always ask your healthcare professional. [place] [address] any warranty or liability for your use of this information. Patient Education        Learning About When to Call Your Doctor During Pregnancy (Up to 20 Weeks)  Your Care Instructions     It's common to have concerns about what might be a problem during pregnancy. Although most pregnant women don't have any serious problems, it's important to know when to call your doctor if you have certain symptoms. These are general suggestions. Your doctor may give you some more information about when to call. When to call your doctor (up to 20 weeks)  Call 911 anytime you think you may need emergency care. For example, call if:  · You passed out (lost consciousness). Call your doctor now or seek immediate medical care if:  · You have a fever. · You have vaginal bleeding. · You are dizzy or lightheaded, or you feel like you may faint. · You have symptoms of a urinary tract infection. These may include:  ? Pain or burning when you urinate. ? A frequent need to urinate without being able to pass much urine.   ? Pain in the flank, which is just below the rib cage and above the waist on either side of the back.  ? Blood in your urine. · You have belly pain. · You think you are having contractions. · You have a sudden release of fluid from your vagina. Watch closely for changes in your health, and be sure to contact your doctor if:  · You have vaginal discharge that smells bad. · You have other concerns about your pregnancy. Follow-up care is a key part of your treatment and safety. Be sure to make and go to all appointments, and call your doctor if you are having problems. It's also a good idea to know your test results and keep a list of the medicines you take. Where can you learn more? Go to https://iMovepepiceweb.[email or web address] org and sign in to your [employer] account. Enter Y317 in the [employer] box to learn more about \"Learning About When to Call Your Doctor During Pregnancy (Up to 20 Weeks). \"     If you do not have an account, please click on the \"Sign Up Now\" link. Current as of: October 8, 2020               Content Version: 12.8  © 2006-2021 [employer]. Care instructions adapted under license by [facility] ([facility]). If you have questions about a medical condition or this instruction, always ask your healthcare professional. [place] [address] any warranty or liability for your use of this information.

## 2021-05-19 NOTE — PROGRESS NOTES
Pt c/o pain to \"belly button\", swelling to bilateral legs x 2 days. Denies any headaches, blurred vision, epigastric pain, urinary tract discomfort or increased urinary frequency, or signs of labor: abdominal tenderness, bleeding, leaking of fluids, contractions, or decreased fetal movement. Plan of care discussed with pt by provider.

## 2021-05-19 NOTE — PROGRESS NOTES
Diabetes Paternal Grandfather     High Cholesterol Paternal Grandfather        MEDS:   Current Outpatient Rx   Medication Sig Dispense Refill    famotidine (PEPCID) 20 MG tablet TAKE 1 TABLET BY MOUTH TWICE DAILY AS NEEDED      metoclopramide (REGLAN) 10 MG tablet TAKE 1 TABLET BY MOUTH FOUR TIMES DAILY AS NEEDED      MAGNESIUM-OXIDE 400 (241.3 Mg) MG TABS tablet PRN      aspirin 81 MG chewable tablet Take 81 mg by mouth daily      Prenatal Multivit-Min-Fe-FA (PRE-CARLEEN FORMULA) TABS Take 1 tablet by mouth      ibuprofen (ADVIL;MOTRIN) 800 MG tablet Take 1 tablet by mouth 2 times daily as needed for Pain (Patient not taking: Reported on 2021) 10 tablet 0    ondansetron (ZOFRAN ODT) 4 MG disintegrating tablet Take 1 tablet by mouth every 8 hours as needed for Nausea or Vomiting (Patient not taking: Reported on 2021) 10 tablet 0    naproxen (NAPROSYN) 500 MG tablet Take 1 tablet by mouth 2 times daily (Patient not taking: Reported on 2021) 30 tablet 0    HYDROcodone-acetaminophen (NORCO) 5-325 MG per tablet Take 1 tablet by mouth every 6 hours as needed for Pain . (Patient not taking: Reported on 2021) 8 tablet 0    CRANBERRY PO Take by mouth (Patient not taking: Reported on 2021)         ALLERGY:   No Known Allergies  Review of the systems:  Leslee Ferreira denies fever, chills, headaches, visual changes, stuffy/running nose, sore throat, chest pain, heart palpitations, edema, pain with breathing, shortness of breath, nausea or vomiting, difficult or painful urination, joint or muscle pain, numbness, tingling, tremors, and/or seizures. Physical Exam  Vitals signs and nursing note reviewed.  /75   Pulse 104   Wt 174 lb 6.4 oz (79.1 kg)   LMP 2020 (Exact Date)   BMI 30.89 kg/m²   Constitutional: Appearance: She is not in distress  Heart rate is regular  Neck is supple with normal range of motion  No respiratory distress  Abdomen is soft  Neurological:      General: No focal deficit present. Mental Status: She is alert and oriented to person, place, and time. No calf tenderness  Psychiatric:         Mood and Affect: Mood normal.         Behavior: Behavior normal.    Fetal heart tones: Positive    IMAGING:  Please see ultrasound report for full details.      LABS:    Routine Prenatal on 05/19/2021   Component Date Value Ref Range Status    Glucose, UA POC 05/19/2021 neg   Final    Protein, UA 05/19/2021 Positive* Negative Final   Initial Prenatal on 04/19/2021   Component Date Value Ref Range Status    Glucose, UA POC 04/19/2021 neg   Final    Protein, UA 04/19/2021 Negative  Negative Final   Admission on 04/02/2021, Discharged on 04/02/2021   Component Date Value Ref Range Status    WBC 04/02/2021 7.0  4.5 - 11.5 E9/L Final    RBC 04/02/2021 3.93  3.50 - 5.50 E12/L Final    Hemoglobin 04/02/2021 11.4* 11.5 - 15.5 g/dL Final    Hematocrit 04/02/2021 33.4* 34.0 - 48.0 % Final    MCV 04/02/2021 85.0  80.0 - 99.9 fL Final    MCH 04/02/2021 29.0  26.0 - 35.0 pg Final    MCHC 04/02/2021 34.1  32.0 - 34.5 % Final    RDW 04/02/2021 12.6  11.5 - 15.0 fL Final    Platelets 89/84/2966 200  130 - 450 E9/L Final    MPV 04/02/2021 9.9  7.0 - 12.0 fL Final    Neutrophils % 04/02/2021 68.7  43.0 - 80.0 % Final    Immature Granulocytes % 04/02/2021 0.3  0.0 - 5.0 % Final    Lymphocytes % 04/02/2021 22.0  20.0 - 42.0 % Final    Monocytes % 04/02/2021 7.1  2.0 - 12.0 % Final    Eosinophils % 04/02/2021 1.6  0.0 - 6.0 % Final    Basophils % 04/02/2021 0.3  0.0 - 2.0 % Final    Neutrophils Absolute 04/02/2021 4.78  1.80 - 7.30 E9/L Final    Immature Granulocytes # 04/02/2021 0.02  E9/L Final    Lymphocytes Absolute 04/02/2021 1.53  1.50 - 4.00 E9/L Final    Monocytes Absolute 04/02/2021 0.49  0.10 - 0.95 E9/L Final    Eosinophils Absolute 04/02/2021 0.11  0.05 - 0.50 E9/L Final    Basophils Absolute 04/02/2021 0.02  0.00 - 0.20 E9/L Final    Sodium 04/02/2021 138  132 - 146 mmol/L Final    Potassium 2021 3.6  3.5 - 5.0 mmol/L Final    Chloride 2021 104  98 - 107 mmol/L Final    CO2 2021 23  22 - 29 mmol/L Final    Anion Gap 2021 11  7 - 16 mmol/L Final    Glucose 2021 87  74 - 99 mg/dL Final    BUN 2021 8  6 - 20 mg/dL Final    CREATININE 2021 0.6  0.5 - 1.0 mg/dL Final    GFR Non- 2021 >60  >=60 mL/min/1.73 Final    GFR  2021 >60   Final    Calcium 2021 8.7  8.6 - 10.2 mg/dL Final    Total Protein 2021 6.3* 6.4 - 8.3 g/dL Final    Albumin 2021 3.8  3.5 - 5.2 g/dL Final    Total Bilirubin 2021 <0.2  0.0 - 1.2 mg/dL Final    Alkaline Phosphatase 2021 53  35 - 104 U/L Final    ALT 2021 29  0 - 32 U/L Final    AST 2021 17  0 - 31 U/L Final    hCG Quant 2021 03606.0* <10 mIU/mL Final    ABO/Rh 2021 A NEG   Final    Color, UA 2021 Yellow  Straw/Yellow Final    Clarity, UA 2021 Clear  Clear Final    Glucose, Ur 2021 Negative  Negative mg/dL Final    Bilirubin Urine 2021 Negative  Negative Final    Ketones, Urine 2021 Negative  Negative mg/dL Final    Specific Gravity, UA 2021 1.025  1.005 - 1.030 Final    Blood, Urine 2021 MODERATE* Negative Final    pH, UA 2021 6.5  5.0 - 9.0 Final    Protein, UA 2021 Negative  Negative mg/dL Final    Urobilinogen, Urine 2021 0.2  <2.0 E.U./dL Final    Nitrite, Urine 2021 Negative  Negative Final    Leukocyte Esterase, Urine 2021 Negative  Negative Final    WBC, UA 2021 0-1  0 - 5 /HPF Final    RBC, UA 2021 0-1  0 - 2 /HPF Final    Bacteria, UA 2021 NONE SEEN  None Seen /HPF Final    Antibody Screen 2021 NEG   Final    RHIG LOT NUMBER 2021 RhImmuneGlobulin    [id number]          issued       1 vial  21 07:21   Final         IMPRESSION:   Geovanni Schulz is a 31 y.o. female  with DI DI twins and fetus A has a cleft lip. RECOMMENDATIONS:  Patient Active Problem List    Diagnosis Date Noted    18 weeks gestation of pregnancy 2021    Dichorionic diamniotic twin gestation 2021     Overview Note:     The patient has a spontaneous dichorionic diamniotic twin gestation. I discussed with the patient the general risks associated with twin gestation:  Maternal risks include  labor,  delivery, PPROM, gestational hypertension, preeclampsia and eclampsia, acute fatty liver of pregnancy, placenta previa, gestational diabetes, and nutritional deficiencies (vitamin Z18 and Folic acid and iron). The fetal risks include  delivery, prematurity related complications (neurological and behavioral), long term pulmonary complications, FGR, malpresentation, operative delivery, increased risk for anomalies, and increased risk for aneuploidy. Recommendations:  1. Baby aspirin to decrease the risk for preeclampsia. 2. Dietary consultation. 3. Anatomy scan at 18 weeks. 4. Fetal growth scan every 4 weeks to screen for FGR  5. Weekly BPP after 32 weeks or more frequently as indicated  6. Deliver at 38 weeks or earlier based on obstetric factors.  Mild tetrahydrocannabinol [facility]) abuse 2021     Overview Note:     - She quit smoking.  - The patient has been counseled to continue quitting smoking  - The risks of smoking during pregnancy were discussed:  7. Premature rupture of the membranes (PPROM)  2.  labor and delivery  3. Abruptio placentae  4. Fetal growth restriction (FGR)  5. Intrauterine fetal demise (IUFD)   10. SIDS  - Recommend   1. ultrasound for fetal growth every 4 weeks   2. Fetal kick counts   3. Weekly Fetal testing after 32 weeks: either a BPP or a modified BPP            Pregnancy complicated by fetal cleft lip, fetus 1 2021     Overview Note:     Fetus A appears to have an isolated unilateral cleft lip.   No other anomalies are noted. Recommendations:  Refer to fetal treatment center at [facility]. Follow up US after 2 weeks to complete the anatomy scan. Referral to [facility]. The total patient time of the visit was 30 minutes, of which was greater than 50% of the time was spent counseling and coordinating care.     Clarisse Flowers MD

## 2021-05-19 NOTE — LETTER
SURGERY  1997    Left eye(Lazy eye)       PERTINENT FAMILY HISTORY:  Family History   Problem Relation Age of Onset   [de-identified] / [place] Mother     Arthritis Maternal Grandmother     Asthma Maternal Grandmother     High Cholesterol Maternal Grandmother     Miscarriages / Stillbirths Maternal Grandmother     Stroke Maternal Grandmother     Cancer Maternal Grandfather     High Blood Pressure Maternal Grandfather     Stroke Maternal Grandfather     Substance Abuse Maternal Grandfather     Diabetes Paternal Grandfather     High Cholesterol Paternal Grandfather        MEDS:   Current Outpatient Rx   Medication Sig Dispense Refill    famotidine (PEPCID) 20 MG tablet TAKE 1 TABLET BY MOUTH TWICE DAILY AS NEEDED      metoclopramide (REGLAN) 10 MG tablet TAKE 1 TABLET BY MOUTH FOUR TIMES DAILY AS NEEDED      MAGNESIUM-OXIDE 400 (241.3 Mg) MG TABS tablet PRN      aspirin 81 MG chewable tablet Take 81 mg by mouth daily      Prenatal Multivit-Min-Fe-FA (PRE- FORMULA) TABS Take 1 tablet by mouth      ibuprofen (ADVIL;MOTRIN) 800 MG tablet Take 1 tablet by mouth 2 times daily as needed for Pain (Patient not taking: Reported on 2021) 10 tablet 0    ondansetron (ZOFRAN ODT) 4 MG disintegrating tablet Take 1 tablet by mouth every 8 hours as needed for Nausea or Vomiting (Patient not taking: Reported on 2021) 10 tablet 0    naproxen (NAPROSYN) 500 MG tablet Take 1 tablet by mouth 2 times daily (Patient not taking: Reported on 2021) 30 tablet 0    HYDROcodone-acetaminophen (NORCO) 5-325 MG per tablet Take 1 tablet by mouth every 6 hours as needed for Pain .  (Patient not taking: Reported on 2021) 8 tablet 0    CRANBERRY PO Take by mouth (Patient not taking: Reported on 2021)         ALLERGY:   No Known Allergies  Review of the systems:  Priti Lawrence denies fever, chills, headaches, visual changes, stuffy/running nose, sore throat, chest pain, heart palpitations, edema, pain with Eosinophils % 04/02/2021 1.6  0.0 - 6.0 % Final    Basophils % 04/02/2021 0.3  0.0 - 2.0 % Final    Neutrophils Absolute 04/02/2021 4.78  1.80 - 7.30 E9/L Final    Immature Granulocytes # 04/02/2021 0.02  E9/L Final    Lymphocytes Absolute 04/02/2021 1.53  1.50 - 4.00 E9/L Final    Monocytes Absolute 04/02/2021 0.49  0.10 - 0.95 E9/L Final    Eosinophils Absolute 04/02/2021 0.11  0.05 - 0.50 E9/L Final    Basophils Absolute 04/02/2021 0.02  0.00 - 0.20 E9/L Final    Sodium 04/02/2021 138  132 - 146 mmol/L Final    Potassium 04/02/2021 3.6  3.5 - 5.0 mmol/L Final    Chloride 04/02/2021 104  98 - 107 mmol/L Final    CO2 04/02/2021 23  22 - 29 mmol/L Final    Anion Gap 04/02/2021 11  7 - 16 mmol/L Final    Glucose 04/02/2021 87  74 - 99 mg/dL Final    BUN 04/02/2021 8  6 - 20 mg/dL Final    CREATININE 04/02/2021 0.6  0.5 - 1.0 mg/dL Final    GFR Non- 04/02/2021 >60  >=60 mL/min/1.73 Final    GFR  04/02/2021 >60   Final    Calcium 04/02/2021 8.7  8.6 - 10.2 mg/dL Final    Total Protein 04/02/2021 6.3* 6.4 - 8.3 g/dL Final    Albumin 04/02/2021 3.8  3.5 - 5.2 g/dL Final    Total Bilirubin 04/02/2021 <0.2  0.0 - 1.2 mg/dL Final    Alkaline Phosphatase 04/02/2021 53  35 - 104 U/L Final    ALT 04/02/2021 29  0 - 32 U/L Final    AST 04/02/2021 17  0 - 31 U/L Final    hCG Quant 04/02/2021 11910.0* <10 mIU/mL Final    ABO/Rh 04/02/2021 A NEG   Final    Color, UA 04/02/2021 Yellow  Straw/Yellow Final    Clarity, UA 04/02/2021 Clear  Clear Final    Glucose, Ur 04/02/2021 Negative  Negative mg/dL Final    Bilirubin Urine 04/02/2021 Negative  Negative Final    Ketones, Urine 04/02/2021 Negative  Negative mg/dL Final    Specific Gravity, UA 04/02/2021 1.025  1.005 - 1.030 Final    Blood, Urine 04/02/2021 MODERATE* Negative Final    pH, UA 04/02/2021 6.5  5.0 - 9.0 Final    Protein, UA 04/02/2021 Negative  Negative mg/dL Final    Urobilinogen, Urine 04/02/2021 0.2 <2.0 E.U./dL Final    Nitrite, Urine 2021 Negative  Negative Final    Leukocyte Esterase, Urine 2021 Negative  Negative Final    WBC, UA 2021 0-1  0 - 5 /HPF Final    RBC, UA 2021 0-1  0 - 2 /HPF Final    Bacteria, UA 2021 NONE SEEN  None Seen /HPF Final    Antibody Screen 2021 NEG   Final    RHIG LOT NUMBER 2021 RhImmuneGlobulin    [id number]          issued       1 vial  21 07:21   Final         IMPRESSION:   Asif Carvalho is a 31 y.o. female  with DI DI twins and fetus A has a cleft lip. RECOMMENDATIONS:  Patient Active Problem List    Diagnosis Date Noted    18 weeks gestation of pregnancy 2021    Dichorionic diamniotic twin gestation 2021     Overview Note:     The patient has a spontaneous dichorionic diamniotic twin gestation. I discussed with the patient the general risks associated with twin gestation:  Maternal risks include  labor,  delivery, PPROM, gestational hypertension, preeclampsia and eclampsia, acute fatty liver of pregnancy, placenta previa, gestational diabetes, and nutritional deficiencies (vitamin J11 and Folic acid and iron). The fetal risks include  delivery, prematurity related complications (neurological and behavioral), long term pulmonary complications, FGR, malpresentation, operative delivery, increased risk for anomalies, and increased risk for aneuploidy. Recommendations:  1. Baby aspirin to decrease the risk for preeclampsia. 2. Dietary consultation. 3. Anatomy scan at 18 weeks. 4. Fetal growth scan every 4 weeks to screen for FGR  5. Weekly BPP after 32 weeks or more frequently as indicated  6. Deliver at 38 weeks or earlier based on obstetric factors.  Mild tetrahydrocannabinol [facility]) abuse 2021     Overview Note:     - She quit smoking.  - The patient has been counseled to continue quitting smoking  - The risks of smoking during pregnancy were discussed:  7.  Premature

## 2021-06-02 ENCOUNTER — ANCILLARY PROCEDURE (OUTPATIENT)
Dept: OBGYN CLINIC | Age: 26
End: 2021-06-02
Payer: MEDICAID

## 2021-06-02 ENCOUNTER — ROUTINE PRENATAL (OUTPATIENT)
Dept: OBGYN CLINIC | Age: 26
End: 2021-06-02
Payer: MEDICAID

## 2021-06-02 VITALS
SYSTOLIC BLOOD PRESSURE: 108 MMHG | BODY MASS INDEX: 31.42 KG/M2 | DIASTOLIC BLOOD PRESSURE: 71 MMHG | HEART RATE: 101 BPM | WEIGHT: 177.4 LBS

## 2021-06-02 DIAGNOSIS — K21.9 REFLUX INVOLVING INTESTINAL TRACT: ICD-10-CM

## 2021-06-02 DIAGNOSIS — O30.042 DICHORIONIC DIAMNIOTIC TWIN PREGNANCY IN SECOND TRIMESTER: Primary | ICD-10-CM

## 2021-06-02 DIAGNOSIS — Z3A.20 20 WEEKS GESTATION OF PREGNANCY: ICD-10-CM

## 2021-06-02 PROBLEM — Z3A.18 18 WEEKS GESTATION OF PREGNANCY: Status: RESOLVED | Noted: 2021-05-19 | Resolved: 2021-06-02

## 2021-06-02 LAB
GLUCOSE URINE, POC: NORMAL
PROTEIN UA: NEGATIVE

## 2021-06-02 PROCEDURE — 76815 OB US LIMITED FETUS(S): CPT | Performed by: OBSTETRICS & GYNECOLOGY

## 2021-06-02 PROCEDURE — 99999 PR OFFICE/OUTPT VISIT,PROCEDURE ONLY: CPT | Performed by: OBSTETRICS & GYNECOLOGY

## 2021-06-02 PROCEDURE — 99213 OFFICE O/P EST LOW 20 MIN: CPT | Performed by: OBSTETRICS & GYNECOLOGY

## 2021-06-02 PROCEDURE — 81002 URINALYSIS NONAUTO W/O SCOPE: CPT | Performed by: OBSTETRICS & GYNECOLOGY

## 2021-06-02 NOTE — PATIENT INSTRUCTIONS
Patient Education        Learning About When to Call Your Doctor During Pregnancy (Up to 20 Weeks)  Your Care Instructions     It's common to have concerns about what might be a problem during pregnancy. Although most pregnant women don't have any serious problems, it's important to know when to call your doctor if you have certain symptoms. These are general suggestions. Your doctor may give you some more information about when to call. When to call your doctor (up to 20 weeks)  Call 911 anytime you think you may need emergency care. For example, call if:  · You passed out (lost consciousness). Call your doctor now or seek immediate medical care if:  · You have a fever. · You have vaginal bleeding. · You are dizzy or lightheaded, or you feel like you may faint. · You have symptoms of a urinary tract infection. These may include:  ? Pain or burning when you urinate. ? A frequent need to urinate without being able to pass much urine. ? Pain in the flank, which is just below the rib cage and above the waist on either side of the back. ? Blood in your urine. · You have belly pain. · You think you are having contractions. · You have a sudden release of fluid from your vagina. Watch closely for changes in your health, and be sure to contact your doctor if:  · You have vaginal discharge that smells bad. · You have other concerns about your pregnancy. Follow-up care is a key part of your treatment and safety. Be sure to make and go to all appointments, and call your doctor if you are having problems. It's also a good idea to know your test results and keep a list of the medicines you take. Where can you learn more? Go to https://[employer]oco.PsychologyOnline. org and sign in to your [employer] account. Enter K489 in the [facility] box to learn more about \"Learning About When to Call Your Doctor During Pregnancy (Up to 20 Weeks). \"     If you do not have an account, please click on the \"Sign Up Now\" link. Current as of: October 8, 2020               Content Version: 12.8  © 7158-4904 [employer] Care instructions adapted under license by [facility] [place] ([facility]). If you have questions about a medical condition or this instruction, always ask your healthcare professional. [address] any warranty or liability for your use of this information. Patient Education        Weeks 18 to 22 of Your Pregnancy: Care Instructions  Overview     Your baby is continuing to develop quickly. Sometime between 18 and 22 weeks, you'll probably start to feel your baby move. At first, these small fetal movements feel like fluttering or \"butterflies. \" Some women say that they feel like gas bubbles. As your baby grows, these movements will become stronger. You may also notice that your baby hiccups. Babies at this stage can now suck their thumbs. You may find that your nausea and fatigue are gone. You may feel better overall and have more energy than you did in your first trimester. But you might now also have some new discomforts, like sleep problems or leg cramps. Talk to your doctor about things you can do at home to ease these problems. Follow-up care is a key part of your treatment and safety. Be sure to make and go to all appointments, and call your doctor if you are having problems. It's also a good idea to know your test results and keep a list of the medicines you take. How can you care for yourself at home? Ease sleep problems  · Avoid caffeine in drinks or chocolate late in the day. · Get some exercise every day. · Take a warm shower or bath before bed. · Have a light snack or glass of milk at bedtime. · Do relaxation exercises in bed to calm your mind and body. · Support your legs and back with extra pillows. Try a pillow between your legs if you sleep on your side. · Do not use sleeping pills or alcohol. They could harm your baby.   Ease leg cramps  · Do not massage your calf during the cramp. · Sit on a firm bed or chair. Straighten your leg, and bend your foot (flex your ankle) slowly upward, toward your knee. Bend your toes up and down. · Stand on a cool, flat surface. Stretch your toes upward, and take small steps walking on your heels. · Use a heating pad or hot water bottle to help with muscle ache. Prevent leg cramps  · Be sure to get enough calcium. If you are worried that you are not getting enough, talk to your doctor. · Exercise every day, and stretch your legs before bed. · Take a warm bath before bed, and try leg warmers at night. Where can you learn more? Go to https://[employer] Internationaleweb.MooBella. org and sign in to your [employer] account. Enter N925 in the [employer] box to learn more about \"Weeks 18 to 22 of Your Pregnancy: Care Instructions. \"     If you do not have an account, please click on the \"Sign Up Now\" link. Current as of: October 8, 2020               Content Version: 12.8  © 4990-4219 [employer]. Care instructions adapted under license by [facility] ([facility]). If you have questions about a medical condition or this instruction, always ask your healthcare professional. [address] any warranty or liability for your use of this information.

## 2021-06-15 ENCOUNTER — APPOINTMENT (OUTPATIENT)
Dept: ULTRASOUND IMAGING | Age: 26
End: 2021-06-15
Payer: MEDICAID

## 2021-06-15 ENCOUNTER — HOSPITAL ENCOUNTER (EMERGENCY)
Age: 26
Discharge: HOME | End: 2021-06-16
Attending: EMERGENCY MEDICINE
Payer: MEDICAID

## 2021-06-15 VITALS
HEART RATE: 95 BPM | TEMPERATURE: 97.4 F | HEIGHT: 64 IN | SYSTOLIC BLOOD PRESSURE: 113 MMHG | WEIGHT: 177 LBS | OXYGEN SATURATION: 99 % | RESPIRATION RATE: 19 BRPM | BODY MASS INDEX: 30.22 KG/M2 | DIASTOLIC BLOOD PRESSURE: 59 MMHG

## 2021-06-15 DIAGNOSIS — M79.604 RIGHT LEG PAIN: ICD-10-CM

## 2021-06-15 DIAGNOSIS — M79.89 LEG SWELLING: Primary | ICD-10-CM

## 2021-06-15 PROCEDURE — 93971 EXTREMITY STUDY: CPT

## 2021-06-15 PROCEDURE — 99283 EMERGENCY DEPT VISIT LOW MDM: CPT

## 2021-06-15 ASSESSMENT — PAIN DESCRIPTION - ORIENTATION: ORIENTATION: RIGHT;LOWER

## 2021-06-15 ASSESSMENT — PAIN DESCRIPTION - PAIN TYPE: TYPE: ACUTE PAIN

## 2021-06-15 ASSESSMENT — PAIN DESCRIPTION - DESCRIPTORS: DESCRIPTORS: ACHING

## 2021-06-15 ASSESSMENT — PAIN DESCRIPTION - LOCATION: LOCATION: LEG

## 2021-06-15 ASSESSMENT — PAIN SCALES - GENERAL: PAINLEVEL_OUTOF10: 4

## 2021-06-16 LAB
AMORPHOUS: ABNORMAL
BACTERIA: ABNORMAL /HPF
BILIRUBIN URINE: NEGATIVE
BLOOD, URINE: NEGATIVE
CLARITY: ABNORMAL
COLOR: YELLOW
EPITHELIAL CELLS, UA: ABNORMAL /HPF
GLUCOSE URINE: NEGATIVE MG/DL
KETONES, URINE: NEGATIVE MG/DL
LEUKOCYTE ESTERASE, URINE: ABNORMAL
NITRITE, URINE: NEGATIVE
PH UA: 8.5 (ref 5–9)
PROTEIN UA: NEGATIVE MG/DL
RBC UA: ABNORMAL /HPF (ref 0–2)
SPECIFIC GRAVITY UA: 1.02 (ref 1–1.03)
UROBILINOGEN, URINE: 0.2 E.U./DL
WBC UA: ABNORMAL /HPF (ref 0–5)

## 2021-06-16 PROCEDURE — 81001 URINALYSIS AUTO W/SCOPE: CPT

## 2021-06-16 ASSESSMENT — ENCOUNTER SYMPTOMS
ABDOMINAL DISTENTION: 0
DIARRHEA: 0
CHEST TIGHTNESS: 0
BACK PAIN: 0
ABDOMINAL PAIN: 0
VOMITING: 0
COUGH: 0
SORE THROAT: 0
SHORTNESS OF BREATH: 0
NAUSEA: 0

## 2021-06-16 NOTE — ED PROVIDER NOTES
ATTENDING PROVIDER ATTESTATION:     I have personally performed and/or participated in the history, exam, medical decision making, and procedures and agree with all pertinent clinical information unless otherwise noted. I have also reviewed and agree with the past medical, family and social history unless otherwise noted. I have discussed this patient in detail with the resident, and provided the instruction and education regarding the risk of lower extremity DVTs and pregnant woman. My findings/plan:     Subjective:      Charmayne Cruz is a 79-year-old woman who is 23 weeks pregnant with twins. She complains of increasing pain and swelling in the right lower extremity. She denies any trauma or overuse. She denies erythema or warmth. She has no history of DVT or PE. Objective:         Alert woman in no acute distress  Gravid abdomen  Mild swelling of the right lower extremity with minimal calf tenderness. Cap refill less than 2 seconds. Sensation intact to the feet. Pulses 2+ and equal  Lower extremity strength is 5/5    Assessment:     Dependent edema rule out DVT    Plan:                    See ED Record    ANUJ Ramirez MD  06/15/21 [id number]       Geoff Angela MD  Resident  06/24/21 [id number]

## 2021-06-16 NOTE — ED PROVIDER NOTES
HPI     Patient is a 31 y.o. femalewith a past medical history of THC abuse, twin gestation who presents with a chief complaint of right leg swelling and pain  This has been occurring for few days. Patient states that it gets better with nothing. Patient states that it gets worse with nothing. Patient states that it is moderate in severity. Patient presents with chief complaint of right leg swelling. Patient stated that this started a few days ago. Patient has never had a blood clot in the past.  Patient states that it sharp and started in her calf but is now radiating upward. Patient states that she has had no recent surgery or recent travel. Patient has twins and is approximately 23 weeks along. Patient previously had some vaginal bleeding but is having none recently. Patient denies any chest pain or shortness of breath. Patient denies any changes in her urinary habits. Review of Systems   Constitutional: Negative for activity change, appetite change, chills, fatigue and fever. HENT: Negative for congestion, drooling and sore throat. Respiratory: Negative for cough, chest tightness and shortness of breath. Cardiovascular: Positive for leg swelling. Negative for chest pain and palpitations. Gastrointestinal: Negative for abdominal distention, abdominal pain, diarrhea, nausea and vomiting. Genitourinary: Negative for decreased urine volume, difficulty urinating, enuresis, flank pain, frequency and hematuria. Musculoskeletal: Negative for arthralgias, back pain and neck stiffness. Skin: Negative for rash and wound. Neurological: Negative for dizziness, facial asymmetry, light-headedness and headaches. Psychiatric/Behavioral: Negative for agitation, confusion and decreased concentration. Physical Exam  Vitals and nursing note reviewed. Constitutional:       Appearance: She is well-developed. HENT:      Head: Normocephalic and atraumatic.    Eyes: Patient was seen and evaluated by myself and my attending No att. providers found. Assessment and Plan discussed with attending provider, please see attestation for final plan of care. This note was done using dictation software and there may be some grammatical errors associated with this. Ale Boateng MD       ED Course as of Jun 24 1211 Wed Jun 16, 2021   0013 US DUP LOWER EXTREMITY RIGHT CALLIE []   0101 Patient reevaluated and was informed of today's findings. Patient will take Tylenol at home for pain. Patient will follow up with her OB/GYN. [ZENA]      ED Course User Index  [ZENA] Ale Boateng MD       --------------------------------------------- PAST HISTORY ---------------------------------------------  Past Medical History:  has a past medical history of Anemia, Asthma, Endometriosis, Postpartum depression, and Rh incompatibility. Past Surgical History:  has a past surgical history that includes Eye surgery (1997). Social History:  reports that she quit smoking about 6 months ago. Her smoking use included cigarettes. She has never used smokeless tobacco. She reports previous drug use. Drug: Marijuana. She reports that she does not drink alcohol. Family History: family history includes Arthritis in her maternal grandmother; Asthma in her maternal grandmother; Cancer in her maternal grandfather; Diabetes in her paternal grandfather; High Blood Pressure in her maternal grandfather; High Cholesterol in her maternal grandmother and paternal grandfather; Bozena Squire / Xu Tracey in her maternal grandmother and mother; Stroke in her maternal grandfather and maternal grandmother; Substance Abuse in her maternal grandfather. The patients home medications have been reviewed. Allergies: Patient has no known allergies.     -------------------------------------------------- RESULTS -------------------------------------------------  Labs:  Results for orders placed or performed during the hospital encounter of 06/15/21   Urinalysis, reflex to microscopic   Result Value Ref Range    Color, UA Yellow Straw/Yellow    Clarity, UA SLCLOUDY Clear    Glucose, Ur Negative Negative mg/dL    Bilirubin Urine Negative Negative    Ketones, Urine Negative Negative mg/dL    Specific Gravity, UA 1.020 1.005 - 1.030    Blood, Urine Negative Negative    pH, UA 8.5 5.0 - 9.0    Protein, UA Negative Negative mg/dL    Urobilinogen, Urine 0.2 <2.0 E.U./dL    Nitrite, Urine Negative Negative    Leukocyte Esterase, Urine TRACE (A) Negative   Microscopic Urinalysis   Result Value Ref Range    WBC, UA 0-1 0 - 5 /HPF    RBC, UA NONE 0 - 2 /HPF    Epithelial Cells, UA FEW /HPF    Bacteria, UA FEW (A) None Seen /HPF    Amorphous, UA MODERATE        Radiology:  US DUP LOWER EXTREMITY RIGHT CALLIE   Final Result   No evidence of DVT in the right lower extremity.             ------------------------- NURSING NOTES AND VITALS REVIEWED ---------------------------  Date / Time Roomed:  6/15/2021 11:16 PM  ED Bed Assignment:  20/20    The nursing notes within the ED encounter and vital signs as below have been reviewed. BP (!) 113/59   Pulse 95   Temp 97.4 °F (36.3 °C) (Infrared)   Resp 19   Ht 5' 4\" (1.626 m)   Wt 177 lb (80.3 kg)   LMP 12/26/2020 (Exact Date)   SpO2 99%   BMI 30.38 kg/m²   Oxygen Saturation Interpretation: Normal      ------------------------------------------ PROGRESS NOTES ------------------------------------------  1:11 AM EDT  I have spoken with the patient and discussed todays results, in addition to providing specific details for the plan of care and counseling regarding the diagnosis and prognosis. Their questions are answered at this time and they are agreeable with the plan. I discussed at length with them reasons for immediate return here for re evaluation. They will followup with their primary care physician by calling their office tomorrow.       --------------------------------- ADDITIONAL PROVIDER NOTES ---------------------------------  At this time the patient is without objective evidence of an acute process requiring hospitalization or inpatient management. They have remained hemodynamically stable throughout their entire ED visit and are stable for discharge with outpatient follow-up. The plan has been discussed in detail and they are aware of the specific conditions for emergent return, as well as the importance of follow-up. Discharge Medication List as of 6/16/2021  1:38 AM          Diagnosis:  1. Leg swelling    2. Right leg pain        Disposition:  Patient's disposition: Discharge to home  Patient's condition is stable.          Hugh Medina MD  Resident  06/17/21 1331       Hugh Medina MD  Resident  06/17/21 1 Aurora Cortez MD  Resident  06/24/21 [id number]

## 2021-07-12 ENCOUNTER — ROUTINE PRENATAL (OUTPATIENT)
Dept: OBGYN CLINIC | Age: 26
End: 2021-07-12
Payer: MEDICAID

## 2021-07-12 ENCOUNTER — ANCILLARY PROCEDURE (OUTPATIENT)
Dept: OBGYN CLINIC | Age: 26
End: 2021-07-12
Payer: MEDICAID

## 2021-07-12 VITALS
HEART RATE: 101 BPM | SYSTOLIC BLOOD PRESSURE: 100 MMHG | WEIGHT: 180.5 LBS | DIASTOLIC BLOOD PRESSURE: 68 MMHG | BODY MASS INDEX: 30.98 KG/M2

## 2021-07-12 DIAGNOSIS — O30.042 DICHORIONIC DIAMNIOTIC TWIN PREGNANCY IN SECOND TRIMESTER: Primary | ICD-10-CM

## 2021-07-12 DIAGNOSIS — Z3A.26 26 WEEKS GESTATION OF PREGNANCY: ICD-10-CM

## 2021-07-12 DIAGNOSIS — O35.AXX0 FETAL CLEFT LIP AND PALATE AFFECTING ANTEPARTUM CARE OF MOTHER, SINGLE OR UNSPECIFIED FETUS: ICD-10-CM

## 2021-07-12 LAB
GLUCOSE URINE, POC: NORMAL
PROTEIN UA: POSITIVE

## 2021-07-12 PROCEDURE — 99213 OFFICE O/P EST LOW 20 MIN: CPT | Performed by: OBSTETRICS & GYNECOLOGY

## 2021-07-12 PROCEDURE — 99999 PR OFFICE/OUTPT VISIT,PROCEDURE ONLY: CPT | Performed by: OBSTETRICS & GYNECOLOGY

## 2021-07-12 PROCEDURE — 76816 OB US FOLLOW-UP PER FETUS: CPT | Performed by: OBSTETRICS & GYNECOLOGY

## 2021-07-12 PROCEDURE — 81002 URINALYSIS NONAUTO W/O SCOPE: CPT | Performed by: OBSTETRICS & GYNECOLOGY

## 2021-07-12 RX ORDER — BLACK COHOSH ROOT 200 MG
CAPSULE ORAL
COMMUNITY
Start: 2021-06-08 | End: 2022-06-27 | Stop reason: ALTCHOICE

## 2021-07-12 NOTE — PATIENT INSTRUCTIONS
Patient Education        Weeks 26 to 30 of Your Pregnancy: Care Instructions  Overview     You are now entering your last trimester of pregnancy. Your baby is growing quickly. Effie Benitez probably feel your baby moving around more often. Your doctor may ask you to count your baby's kicks. Your back may ache as your body gets used to your baby's size and length. If you haven't already had the Tdap shot during this pregnancy, talk to your doctor about getting it. It will help protect your  against pertussis infection. During this time, it's important to take care of yourself and pay attention to what your body needs. If you feel sexual, you can explore ways to be close with your partner that match your comfort and desire. Follow-up care is a key part of your treatment and safety. Be sure to make and go to all appointments, and call your doctor if you are having problems. It's also a good idea to know your test results and keep a list of the medicines you take. How can you care for yourself at home? Take it easy at work  · Take frequent breaks. If possible, stop working when you are tired, and rest during your lunch hour. · Take bathroom breaks every 2 hours. · Change positions often. If you sit for long periods, stand up and walk around. · When you stand for a long time, keep one foot on a low stool with your knee bent. After standing a lot, sit with your feet up. · Avoid fumes, chemicals, and tobacco smoke. Be sexual in your own way  · Having sex during pregnancy is okay, unless your doctor tells you not to. · You may be very interested in sex, or you may have no interest at all. · Your growing belly can make it hard to find a good position during intercourse. [place] and explore. · You may get cramps in your uterus when your partner touches your breasts. · A back rub may relieve the backache or cramps that sometimes follow orgasm. Learn about  labor  · Watch for signs of  labor. You may be going into labor if:  ? You have menstrual-like cramps, with or without nausea. ? You have about 6 or more contractions in 1 hour, even after you have had a glass of water and are resting. ? You have a low, dull backache that does not go away when you change your position. ? You have pain or pressure in your pelvis that comes and goes in a pattern. ? You have intestinal cramping or flu-like symptoms, with or without diarrhea.  ? You notice an increase or change in your vaginal discharge. Discharge may be heavy, mucus-like, watery, or streaked with blood. ? Your water breaks. · If you think you have  labor:  ? Drink 2 or 3 glasses of water or juice. Not drinking enough fluids can cause contractions. ? Stop what you are doing, and empty your bladder. Then lie down on your left side for at least 1 hour. ? While lying on your side, find your breast bone. Put your fingers in the soft spot just below it. Move your fingers down toward your belly button to find the top of your uterus. Check to see if it is tight. ? Contractions can be weak or strong. Record your contractions for an hour. Time a contraction from the start of one contraction to the start of the next one.  ? Single or several strong contractions without a pattern are called Bakari-Churchill contractions. They are practice contractions but not the start of labor. They often stop if you change what you are doing. ? Call your doctor if you have regular contractions. Where can you learn more? Go to https://Jukedeckchoco.[facility]. org and sign in to your [employer] account. Enter N336 in the [facility] box to learn more about \"Weeks 26 to 30 of Your Pregnancy: Care Instructions. \"     If you do not have an account, please click on the \"Sign Up Now\" link. Current as of: 2020               Content Version: 12.9  © 8176-6108 Healthwise, Incorporated. Care instructions adapted under license by [facility] ([facility]).  If you urine. Watch closely for changes in your health, and be sure to contact your doctor if:  · You have vaginal discharge that smells bad. · You have skin changes, such as:  ? A rash. ? Itching. ? Yellow color to your skin. · You have other concerns about your pregnancy. If you have labor signs at 37 weeks or more  If you have signs of labor at 37 weeks or more, your doctor may tell you to call when your labor becomes more active. Symptoms of active labor include:  · Contractions that are regular. · Contractions that are less than 5 minutes apart. · Contractions that are hard to talk through. Follow-up care is a key part of your treatment and safety. Be sure to make and go to all appointments, and call your doctor if you are having problems. It's also a good idea to know your test results and keep a list of the medicines you take. Where can you learn more? Go to https://Appboymario albertoeb.Yellow Pages. org and sign in to your [employer] account. Enter  in the [employer] box to learn more about \"Learning About When to Call Your Doctor During Pregnancy (After 20 Weeks). \"     If you do not have an account, please click on the \"Sign Up Now\" link. Current as of: October 8, 2020               Content Version: 12.9  © 2006-2021 Healthwise, Incorporated. Care instructions adapted under license by [facility] ([facility]). If you have questions about a medical condition or this instruction, always ask your healthcare professional. [place] [address] any warranty or liability for your use of this information.

## 2021-07-12 NOTE — PROGRESS NOTES
States babies are active Denies bleeding leakage of fluid or contractions. Knowledgeable of fetal kick counts will start in 3rd trimester. Call your obstetrician for:    Bleeding, leakage of fluid, abdominal tenderness, headaches, burred vision or  epigastric pain or decreased fetal movement or increased in urinary frequency.    Call if any questions or concerns

## 2021-07-27 ENCOUNTER — TELEPHONE (OUTPATIENT)
Dept: OBGYN CLINIC | Age: 26
End: 2021-07-27

## 2021-07-27 ENCOUNTER — APPOINTMENT (OUTPATIENT)
Dept: ULTRASOUND IMAGING | Age: 26
End: 2021-07-27
Payer: MEDICAID

## 2021-07-27 ENCOUNTER — HOSPITAL ENCOUNTER (OUTPATIENT)
Age: 26
Discharge: HOME | End: 2021-07-27
Attending: LEGAL MEDICINE | Admitting: LEGAL MEDICINE
Payer: MEDICAID

## 2021-07-27 VITALS — OXYGEN SATURATION: 98 % | HEART RATE: 103 BPM | DIASTOLIC BLOOD PRESSURE: 69 MMHG | SYSTOLIC BLOOD PRESSURE: 113 MMHG

## 2021-07-27 PROBLEM — O26.93 COMPLICATED PREGNANCY, THIRD TRIMESTER: Status: ACTIVE | Noted: 2021-07-27

## 2021-07-27 LAB
ALBUMIN SERPL-MCNC: 3.2 G/DL (ref 3.5–5.2)
ALP BLD-CCNC: 78 U/L (ref 35–104)
ALT SERPL-CCNC: 18 U/L (ref 0–32)
AMPHETAMINE SCREEN, URINE: NOT DETECTED
ANION GAP SERPL CALCULATED.3IONS-SCNC: 10 MMOL/L (ref 7–16)
AST SERPL-CCNC: 15 U/L (ref 0–31)
BACTERIA: ABNORMAL /HPF
BARBITURATE SCREEN URINE: NOT DETECTED
BASOPHILS ABSOLUTE: 0.02 E9/L (ref 0–0.2)
BASOPHILS RELATIVE PERCENT: 0.3 % (ref 0–2)
BENZODIAZEPINE SCREEN, URINE: NOT DETECTED
BILIRUB SERPL-MCNC: 0.3 MG/DL (ref 0–1.2)
BILIRUBIN URINE: NEGATIVE
BLOOD, URINE: NEGATIVE
BUN BLDV-MCNC: 8 MG/DL (ref 6–20)
CALCIUM SERPL-MCNC: 8.5 MG/DL (ref 8.6–10.2)
CANNABINOID SCREEN URINE: NOT DETECTED
CHLORIDE BLD-SCNC: 104 MMOL/L (ref 98–107)
CLARITY: CLEAR
CO2: 23 MMOL/L (ref 22–29)
COCAINE METABOLITE SCREEN URINE: NOT DETECTED
COLOR: YELLOW
CREAT SERPL-MCNC: 0.6 MG/DL (ref 0.5–1)
EOSINOPHILS ABSOLUTE: 0.09 E9/L (ref 0.05–0.5)
EOSINOPHILS RELATIVE PERCENT: 1.1 % (ref 0–6)
EPITHELIAL CELLS, UA: ABNORMAL /HPF
FENTANYL SCREEN, URINE: NOT DETECTED
GFR AFRICAN AMERICAN: >60
GFR NON-AFRICAN AMERICAN: >60 ML/MIN/1.73
GLUCOSE BLD-MCNC: 92 MG/DL (ref 74–99)
GLUCOSE URINE: NEGATIVE MG/DL
HCT VFR BLD CALC: 29.5 % (ref 34–48)
HEMOGLOBIN: 10.3 G/DL (ref 11.5–15.5)
IMMATURE GRANULOCYTES #: 0.04 E9/L
IMMATURE GRANULOCYTES %: 0.5 % (ref 0–5)
KETONES, URINE: NEGATIVE MG/DL
LEUKOCYTE ESTERASE, URINE: ABNORMAL
LYMPHOCYTES ABSOLUTE: 1.47 E9/L (ref 1.5–4)
LYMPHOCYTES RELATIVE PERCENT: 18.5 % (ref 20–42)
Lab: NORMAL
MCH RBC QN AUTO: 30.6 PG (ref 26–35)
MCHC RBC AUTO-ENTMCNC: 34.9 % (ref 32–34.5)
MCV RBC AUTO: 87.5 FL (ref 80–99.9)
METHADONE SCREEN, URINE: NOT DETECTED
MONOCYTES ABSOLUTE: 0.71 E9/L (ref 0.1–0.95)
MONOCYTES RELATIVE PERCENT: 9 % (ref 2–12)
NEUTROPHILS ABSOLUTE: 5.6 E9/L (ref 1.8–7.3)
NEUTROPHILS RELATIVE PERCENT: 70.6 % (ref 43–80)
NITRITE, URINE: NEGATIVE
OPIATE SCREEN URINE: NOT DETECTED
OXYCODONE URINE: NOT DETECTED
PDW BLD-RTO: 13.4 FL (ref 11.5–15)
PH UA: 7 (ref 5–9)
PHENCYCLIDINE SCREEN URINE: NOT DETECTED
PLATELET # BLD: 197 E9/L (ref 130–450)
PMV BLD AUTO: 10 FL (ref 7–12)
POTASSIUM SERPL-SCNC: 3.8 MMOL/L (ref 3.5–5)
PROTEIN UA: NEGATIVE MG/DL
RBC # BLD: 3.37 E12/L (ref 3.5–5.5)
RBC UA: ABNORMAL /HPF (ref 0–2)
SODIUM BLD-SCNC: 137 MMOL/L (ref 132–146)
SPECIFIC GRAVITY UA: 1.01 (ref 1–1.03)
TOTAL PROTEIN: 5.8 G/DL (ref 6.4–8.3)
UROBILINOGEN, URINE: 0.2 E.U./DL
WBC # BLD: 7.9 E9/L (ref 4.5–11.5)
WBC UA: ABNORMAL /HPF (ref 0–5)

## 2021-07-27 PROCEDURE — 85025 COMPLETE CBC W/AUTO DIFF WBC: CPT

## 2021-07-27 PROCEDURE — 80307 DRUG TEST PRSMV CHEM ANLYZR: CPT

## 2021-07-27 PROCEDURE — 93005 ELECTROCARDIOGRAM TRACING: CPT | Performed by: LEGAL MEDICINE

## 2021-07-27 PROCEDURE — 80053 COMPREHEN METABOLIC PANEL: CPT

## 2021-07-27 PROCEDURE — 36415 COLL VENOUS BLD VENIPUNCTURE: CPT

## 2021-07-27 PROCEDURE — 81001 URINALYSIS AUTO W/SCOPE: CPT

## 2021-07-27 PROCEDURE — 93970 EXTREMITY STUDY: CPT

## 2021-07-27 PROCEDURE — 6370000000 HC RX 637 (ALT 250 FOR IP): Performed by: LEGAL MEDICINE

## 2021-07-27 RX ORDER — PANTOPRAZOLE SODIUM 40 MG/1
40 TABLET, DELAYED RELEASE ORAL ONCE
Status: COMPLETED | OUTPATIENT
Start: 2021-07-27 | End: 2021-07-27

## 2021-07-27 RX ADMIN — PANTOPRAZOLE SODIUM 40 MG: 40 TABLET, DELAYED RELEASE ORAL at 22:02

## 2021-07-27 NOTE — TELEPHONE ENCOUNTER
Called to check on patient, no answer, left message. Called spoke with Joan Harp at [facility] told her that patient stated she called there first but they had no message, she will tell DR Robyn Rodriguez of  her complaints of 130 HR with B/P 93/59 and patient instructed to go to hospital if persisted. Called back Chey answered stated she is feeling better, but I asked that she call DR Robny Rodriguez they are waiting to hear from her.

## 2021-07-28 ENCOUNTER — HOSPITAL ENCOUNTER (OUTPATIENT)
Dept: INFUSION THERAPY | Age: 26
Setting detail: INFUSION SERIES
Discharge: HOME | End: 2021-07-28
Payer: MEDICAID

## 2021-07-28 ENCOUNTER — HOSPITAL ENCOUNTER (OUTPATIENT)
Age: 26
Discharge: HOME | End: 2021-07-28
Payer: MEDICAID

## 2021-07-28 LAB
ABO/RH: NORMAL
ANTIBODY SCREEN: NORMAL
EKG ATRIAL RATE: 90 BPM
EKG P AXIS: 27 DEGREES
EKG P-R INTERVAL: 154 MS
EKG Q-T INTERVAL: 366 MS
EKG QRS DURATION: 78 MS
EKG QTC CALCULATION (BAZETT): 447 MS
EKG R AXIS: 14 DEGREES
EKG T AXIS: 23 DEGREES
EKG VENTRICULAR RATE: 90 BPM
RHIG LOT NUMBER: NORMAL

## 2021-07-28 PROCEDURE — 86901 BLOOD TYPING SEROLOGIC RH(D): CPT

## 2021-07-28 PROCEDURE — 99212 OFFICE O/P EST SF 10 MIN: CPT

## 2021-07-28 PROCEDURE — 96372 THER/PROPH/DIAG INJ SC/IM: CPT

## 2021-07-28 PROCEDURE — 86900 BLOOD TYPING SEROLOGIC ABO: CPT

## 2021-07-28 PROCEDURE — 36415 COLL VENOUS BLD VENIPUNCTURE: CPT

## 2021-07-28 PROCEDURE — 6360000002 HC RX W HCPCS: Performed by: LEGAL MEDICINE

## 2021-07-28 PROCEDURE — 86850 RBC ANTIBODY SCREEN: CPT

## 2021-07-28 RX ADMIN — HUMAN RHO(D) IMMUNE GLOBULIN 300 MCG: 300 INJECTION, SOLUTION INTRAMUSCULAR at 14:11

## 2021-07-28 NOTE — PROGRESS NOTES
32 yo  jorge 10/15/21 28w4d twin pregnancy ambulatory into LND with c/o tachycardia and chest pain. Patient states for the last week she has had episodes of tachycardia that causes her chest to hurt, and that these episodes happen around once to twice a day. Patient states she has also noticed increased swelling in her legs. Patient denies any LOF, vaginal bleeding, or ctx. Patient states having felt painless brody orellana on occasion. Patient states she has spoken with MFM about this current issue and has spoken to DR Gabriela Blanchard as well and was told to come to the unit. Taken to triage 2 and oriented to room and call light. efm placed and assessed every 15 minutes. Patient perceives adequate fetal movement.

## 2021-07-28 NOTE — H&P
7/27/21 [id number]
clubbing, cyanosis. Trace edema. No cords. No  erythema. NEUROLOGIC:  CN II through XII are grossly intact. She is alert and  oriented x4. ASSESSMENT:  Intrauterine pregnancy at 28 weeks and 4 days with twin  gestation with complaints of chest pain for one week as well as  complaints of intermittent maternal tachycardia up to 130. The patient  with complaints of two weeks of heartburn. PLAN:   will go ahead and obtain CBC, CMP, UA, urine drug screen,  ultrasound of her legs, and EKG. Further management based on clinical  findings.         Yisel Quigley MD    D: 07/27/2021 20:29:53       T: 07/27/2021 20:34:51     [id number]  Job#: [id number]     Doc#: [id number]    CC:

## 2021-07-28 NOTE — DISCHARGE SUMMARY
[address]                               DISCHARGE SUMMARY    PATIENT NAME: Caroline Kirk                      :        1995  MED REC NO:   [id number]                            ROOM:       [facility]  ACCOUNT NO:   [de-identified]                           ADMIT DATE: 2021. PROVIDER:     Jacinto Espinosa MD                  DISCHARGE DATE:  2021    ADMITTING DIAGNOSES:  Intrauterine pregnancy at 38 weeks and 4 days with  complaints of chest pain, intermittent maternal tachycardia, two weeks  of heartburn. DISCHARGE DIAGNOSES:  Intrauterine pregnancy at 38 weeks and 4 days with  complaints of chest pain, intermittent maternal tachycardia, two weeks  of heartburn. HOSPITAL COURSE IN DETAIL:  The patient's followup blood pressure was  113/69 with pulse of 96 and then 103. O2 sat was 99% on room air to 98%  on room air. Her labs revealed a potassium of 3.8, ALT 18, AST 15,  glucose 92. White count 7.9, hemoglobin 10.3, platelets 966,774. Urine  drug screen negative. EKG demonstrated normal sinus rhythm of 90 and  was unremarkable. Duplex ultrasound of the lower extremities revealed  no evidence of DVT. She received Nexium for her heartburn with some  slight relief. She had no further complaints and felt ready to go home. She was therefore discharged home with fetal movement and  labor  and DVT and PE precautions. She is to follow up at the office tomorrow  for H. pylori testing. She indicated understanding of all instructions.         Yas Owens MD    D: 2021 3:35:43       T: 2021 3:42:12     [email or web address]  Job#: [id number]     Doc#: [id number]    CC:

## 2021-08-02 DIAGNOSIS — O99.013 ANEMIA COMPLICATING PREGNANCY IN THIRD TRIMESTER: ICD-10-CM

## 2021-08-02 RX ORDER — EPINEPHRINE 1 MG/ML
0.3 INJECTION, SOLUTION, CONCENTRATE INTRAVENOUS PRN
Status: CANCELLED | OUTPATIENT
Start: 2021-08-03

## 2021-08-02 RX ORDER — HEPARIN SODIUM (PORCINE) LOCK FLUSH IV SOLN 100 UNIT/ML 100 UNIT/ML
500 SOLUTION INTRAVENOUS PRN
Status: CANCELLED | OUTPATIENT
Start: 2021-08-03

## 2021-08-02 RX ORDER — SODIUM CHLORIDE 9 MG/ML
INJECTION, SOLUTION INTRAVENOUS CONTINUOUS
Status: CANCELLED | OUTPATIENT
Start: 2021-08-03

## 2021-08-02 RX ORDER — SODIUM CHLORIDE 0.9 % (FLUSH) 0.9 %
5-40 SYRINGE (ML) INJECTION PRN
Status: CANCELLED | OUTPATIENT
Start: 2021-08-03

## 2021-08-02 RX ORDER — SODIUM CHLORIDE 9 MG/ML
25 INJECTION, SOLUTION INTRAVENOUS PRN
Status: CANCELLED | OUTPATIENT
Start: 2021-08-03

## 2021-08-02 RX ORDER — DIPHENHYDRAMINE HYDROCHLORIDE 50 MG/ML
50 INJECTION INTRAMUSCULAR; INTRAVENOUS ONCE
Status: CANCELLED | OUTPATIENT
Start: 2021-08-03 | End: 2021-08-03

## 2021-08-02 RX ORDER — METHYLPREDNISOLONE SODIUM SUCCINATE 125 MG/2ML
125 INJECTION, POWDER, LYOPHILIZED, FOR SOLUTION INTRAMUSCULAR; INTRAVENOUS ONCE
Status: CANCELLED | OUTPATIENT
Start: 2021-08-03 | End: 2021-08-03

## 2021-08-11 RX ORDER — SODIUM CHLORIDE 0.9 % (FLUSH) 0.9 %
5-40 SYRINGE (ML) INJECTION PRN
Status: CANCELLED | OUTPATIENT
Start: 2021-08-12

## 2021-08-11 RX ORDER — METHYLPREDNISOLONE SODIUM SUCCINATE 125 MG/2ML
125 INJECTION, POWDER, LYOPHILIZED, FOR SOLUTION INTRAMUSCULAR; INTRAVENOUS ONCE
Status: CANCELLED | OUTPATIENT
Start: 2021-08-12 | End: 2021-08-12

## 2021-08-11 RX ORDER — SODIUM CHLORIDE 9 MG/ML
INJECTION, SOLUTION INTRAVENOUS CONTINUOUS
Status: CANCELLED | OUTPATIENT
Start: 2021-08-12

## 2021-08-11 RX ORDER — EPINEPHRINE 1 MG/ML
0.3 INJECTION, SOLUTION, CONCENTRATE INTRAVENOUS PRN
Status: CANCELLED | OUTPATIENT
Start: 2021-08-12

## 2021-08-11 RX ORDER — DIPHENHYDRAMINE HYDROCHLORIDE 50 MG/ML
50 INJECTION INTRAMUSCULAR; INTRAVENOUS ONCE
Status: CANCELLED | OUTPATIENT
Start: 2021-08-12 | End: 2021-08-12

## 2021-08-11 RX ORDER — MEPERIDINE HYDROCHLORIDE 25 MG/ML
12.5 INJECTION INTRAMUSCULAR; INTRAVENOUS; SUBCUTANEOUS ONCE
Status: CANCELLED | OUTPATIENT
Start: 2021-08-12 | End: 2021-08-12

## 2021-08-11 RX ORDER — HEPARIN SODIUM (PORCINE) LOCK FLUSH IV SOLN 100 UNIT/ML 100 UNIT/ML
500 SOLUTION INTRAVENOUS PRN
Status: CANCELLED | OUTPATIENT
Start: 2021-08-12

## 2021-08-16 ENCOUNTER — ANCILLARY PROCEDURE (OUTPATIENT)
Dept: OBGYN CLINIC | Age: 26
End: 2021-08-16
Payer: MEDICAID

## 2021-08-16 ENCOUNTER — ROUTINE PRENATAL (OUTPATIENT)
Dept: OBGYN CLINIC | Age: 26
End: 2021-08-16
Payer: MEDICAID

## 2021-08-16 VITALS
SYSTOLIC BLOOD PRESSURE: 95 MMHG | BODY MASS INDEX: 31.24 KG/M2 | DIASTOLIC BLOOD PRESSURE: 63 MMHG | HEART RATE: 96 BPM | WEIGHT: 182 LBS

## 2021-08-16 DIAGNOSIS — O30.042 DICHORIONIC DIAMNIOTIC TWIN PREGNANCY IN SECOND TRIMESTER: Primary | ICD-10-CM

## 2021-08-16 DIAGNOSIS — Z3A.31 31 WEEKS GESTATION OF PREGNANCY: ICD-10-CM

## 2021-08-16 LAB
GLUCOSE URINE, POC: NORMAL
PROTEIN UA: POSITIVE

## 2021-08-16 PROCEDURE — 99999 PR OFFICE/OUTPT VISIT,PROCEDURE ONLY: CPT | Performed by: OBSTETRICS & GYNECOLOGY

## 2021-08-16 PROCEDURE — 81002 URINALYSIS NONAUTO W/O SCOPE: CPT | Performed by: OBSTETRICS & GYNECOLOGY

## 2021-08-16 PROCEDURE — 99213 OFFICE O/P EST LOW 20 MIN: CPT | Performed by: OBSTETRICS & GYNECOLOGY

## 2021-08-16 PROCEDURE — 76816 OB US FOLLOW-UP PER FETUS: CPT | Performed by: OBSTETRICS & GYNECOLOGY

## 2021-08-16 NOTE — PROGRESS NOTES
Pt is here today for prenatal ultrasound. Pt having [facility] Doctor, Pt having discomfort at urethra when sitting but not when voiding. Babies are active per pt. Denies bleeding, leaking of fluids. Plan of care discussed with pt by provider.

## 2021-08-30 ENCOUNTER — HOSPITAL ENCOUNTER (OUTPATIENT)
Age: 26
Discharge: HOME | End: 2021-08-30
Attending: LEGAL MEDICINE | Admitting: LEGAL MEDICINE
Payer: MEDICAID

## 2021-08-30 ENCOUNTER — APPOINTMENT (OUTPATIENT)
Dept: LABOR AND DELIVERY | Age: 26
End: 2021-08-30
Payer: MEDICAID

## 2021-08-30 VITALS
TEMPERATURE: 98.8 F | SYSTOLIC BLOOD PRESSURE: 98 MMHG | RESPIRATION RATE: 16 BRPM | DIASTOLIC BLOOD PRESSURE: 59 MMHG | HEART RATE: 120 BPM

## 2021-08-30 PROCEDURE — 59025 FETAL NON-STRESS TEST: CPT

## 2021-08-30 NOTE — PROGRESS NOTES
33w3d  BRONSON 10/15/21 ambulatory to the L&D unit for NST. EFM applied. Maternal perception of fetal movement for both twins perceived. Pt denies any bleeding or leaking fluids. Pt denies any contractions pr pain. Call light within reach.

## 2021-09-01 ENCOUNTER — HOSPITAL ENCOUNTER (OUTPATIENT)
Dept: INFUSION THERAPY | Age: 26
Setting detail: INFUSION SERIES
Discharge: HOME | End: 2021-09-01
Payer: MEDICAID

## 2021-09-01 ENCOUNTER — HOSPITAL ENCOUNTER (OUTPATIENT)
Age: 26
Discharge: HOME | End: 2021-09-01
Attending: LEGAL MEDICINE | Admitting: LEGAL MEDICINE
Payer: MEDICAID

## 2021-09-01 ENCOUNTER — APPOINTMENT (OUTPATIENT)
Dept: LABOR AND DELIVERY | Age: 26
End: 2021-09-01
Payer: MEDICAID

## 2021-09-01 VITALS
DIASTOLIC BLOOD PRESSURE: 57 MMHG | SYSTOLIC BLOOD PRESSURE: 105 MMHG | RESPIRATION RATE: 20 BRPM | HEART RATE: 104 BPM | TEMPERATURE: 97.2 F | OXYGEN SATURATION: 96 %

## 2021-09-01 VITALS — SYSTOLIC BLOOD PRESSURE: 113 MMHG | DIASTOLIC BLOOD PRESSURE: 66 MMHG | HEART RATE: 113 BPM

## 2021-09-01 DIAGNOSIS — O99.013 ANEMIA COMPLICATING PREGNANCY IN THIRD TRIMESTER: Primary | ICD-10-CM

## 2021-09-01 PROCEDURE — 2580000003 HC RX 258: Performed by: LEGAL MEDICINE

## 2021-09-01 PROCEDURE — 96365 THER/PROPH/DIAG IV INF INIT: CPT

## 2021-09-01 PROCEDURE — 96361 HYDRATE IV INFUSION ADD-ON: CPT

## 2021-09-01 PROCEDURE — 59025 FETAL NON-STRESS TEST: CPT

## 2021-09-01 PROCEDURE — 6360000002 HC RX W HCPCS: Performed by: LEGAL MEDICINE

## 2021-09-01 RX ORDER — SODIUM CHLORIDE 0.9 % (FLUSH) 0.9 %
5-40 SYRINGE (ML) INJECTION PRN
Status: CANCELLED | OUTPATIENT
Start: 2021-09-08

## 2021-09-01 RX ORDER — SODIUM CHLORIDE 0.9 % (FLUSH) 0.9 %
5-40 SYRINGE (ML) INJECTION PRN
Status: DISCONTINUED | OUTPATIENT
Start: 2021-09-01 | End: 2021-09-02 | Stop reason: HOSPADM

## 2021-09-01 RX ORDER — DIPHENHYDRAMINE HYDROCHLORIDE 50 MG/ML
50 INJECTION INTRAMUSCULAR; INTRAVENOUS ONCE
Status: CANCELLED | OUTPATIENT
Start: 2021-09-08 | End: 2021-09-08

## 2021-09-01 RX ORDER — METHYLPREDNISOLONE SODIUM SUCCINATE 125 MG/2ML
125 INJECTION, POWDER, LYOPHILIZED, FOR SOLUTION INTRAMUSCULAR; INTRAVENOUS ONCE
Status: CANCELLED | OUTPATIENT
Start: 2021-09-08 | End: 2021-09-08

## 2021-09-01 RX ORDER — MEPERIDINE HYDROCHLORIDE 25 MG/ML
12.5 INJECTION INTRAMUSCULAR; INTRAVENOUS; SUBCUTANEOUS ONCE
Status: CANCELLED | OUTPATIENT
Start: 2021-09-08 | End: 2021-09-08

## 2021-09-01 RX ORDER — EPINEPHRINE 1 MG/ML
0.3 INJECTION, SOLUTION, CONCENTRATE INTRAVENOUS PRN
Status: CANCELLED | OUTPATIENT
Start: 2021-09-08

## 2021-09-01 RX ORDER — SODIUM CHLORIDE 9 MG/ML
INJECTION, SOLUTION INTRAVENOUS CONTINUOUS
Status: CANCELLED | OUTPATIENT
Start: 2021-09-08

## 2021-09-01 RX ORDER — HEPARIN SODIUM (PORCINE) LOCK FLUSH IV SOLN 100 UNIT/ML 100 UNIT/ML
500 SOLUTION INTRAVENOUS PRN
Status: CANCELLED | OUTPATIENT
Start: 2021-09-08

## 2021-09-01 RX ORDER — SODIUM CHLORIDE 9 MG/ML
INJECTION, SOLUTION INTRAVENOUS CONTINUOUS
Status: DISCONTINUED | OUTPATIENT
Start: 2021-09-01 | End: 2021-09-02 | Stop reason: HOSPADM

## 2021-09-01 RX ADMIN — IRON SUCROSE 200 MG: 20 INJECTION, SOLUTION INTRAVENOUS at 12:39

## 2021-09-01 RX ADMIN — Medication 10 ML: at 12:39

## 2021-09-01 RX ADMIN — SODIUM CHLORIDE: 9 INJECTION, SOLUTION INTRAVENOUS at 12:30

## 2021-09-01 NOTE — PROGRESS NOTES
Pt arrived for NST. EFM applied. Pt denies any bleeding, leaking, or contractions. Perceives fetal movement on both fetuses.

## 2021-09-08 ENCOUNTER — APPOINTMENT (OUTPATIENT)
Dept: ULTRASOUND IMAGING | Age: 26
End: 2021-09-08
Payer: MEDICAID

## 2021-09-08 ENCOUNTER — APPOINTMENT (OUTPATIENT)
Dept: LABOR AND DELIVERY | Age: 26
End: 2021-09-08
Payer: MEDICAID

## 2021-09-08 ENCOUNTER — HOSPITAL ENCOUNTER (OUTPATIENT)
Age: 26
Discharge: HOME | End: 2021-09-08
Attending: LEGAL MEDICINE | Admitting: LEGAL MEDICINE
Payer: MEDICAID

## 2021-09-08 VITALS
HEART RATE: 106 BPM | TEMPERATURE: 98 F | SYSTOLIC BLOOD PRESSURE: 103 MMHG | RESPIRATION RATE: 18 BRPM | DIASTOLIC BLOOD PRESSURE: 62 MMHG

## 2021-09-08 PROCEDURE — 59025 FETAL NON-STRESS TEST: CPT

## 2021-09-08 PROCEDURE — 76819 FETAL BIOPHYS PROFIL W/O NST: CPT

## 2021-09-08 NOTE — PROGRESS NOTES
Patient has returned from [facility]

## 2021-09-08 NOTE — PROGRESS NOTES
Patient's twin a difficult to monitor -I sat and held both monitors in place . Twin B reactive Twin A moved had an excel,pt states she feels twin a is not moving very much today. DR Deidra Magallanes notified _bpp ordered,

## 2021-09-08 NOTE — PROGRESS NOTES
Tracing faxed to office per RN's request. Constantin Benoit remains at bedside palpating both fetuses movement. Office notified that MARIA T Danielson would like to speak with Dr. Isela Loera.

## 2021-09-08 NOTE — H&P
[address]                              HISTORY AND PHYSICAL    PATIENT NAME: Iram Melissa                      :        1995  MED REC NO:   [id number]                            ROOM:       [facility]  ACCOUNT NO:   [de-identified]                           ADMIT DATE: 2021. PROVIDER:     Stephen Lemus MD    HISTORY OF PRESENT ILLNESS:   58-year-old white female presented for a  nonstress test secondary to twin gestation with 22 % discordance. She also  complained of decreased fetal movement. They had difficulty in being  able to  the twins heartbeats. She denied leaking fluid or  vaginal bleeding. No contractions. No change in her bowel or urinary  habits. For her past medical, surgical, GYN, social, family history, please  refer to ACOG forms A and B. REVIEW OF SYSTEMS:  Negative for cardiac, respiratory, GI, ,  neurologic, psychiatric, ENT, integument, hematologic, lymphatic, and  constitutional abnormalities. PHYSICAL EXAMINATION:  VITAL SIGNS:  Stable. Blood pressure is 103/62, pulse 106, respiratory  rate 18, and temperature 98. HEENT:  Negative. LUNGS:  Clear to auscultation. CV:  Regular rate and rhythm. ABDOMEN:  Gravid, soft, nontender. Fetal heart tones on one twin are in  the 130s and the other is in the 140s. Both appear reactive. No  contractions. EXTREMITIES:  No clubbing or cyanosis. 2+ edema. NEURO:  CN II through XII are grossly intact. She is alert and oriented  x4. ASSESSMENT:  Intrauterine pregnancy at 34 weeks and 5 days with twin  gestation with complaints of decreased fetal movement. PLAN:   will go ahead and perform a biophysical profile. Further  management based on clinical findings.         Buster Scheuermann, MD    D: 2021 13:26:19       T: 2021 13:29:25     [email or web address]  Job#: [id number]     Doc#: [id number]    CC:

## 2021-09-09 ENCOUNTER — HOSPITAL ENCOUNTER (OUTPATIENT)
Dept: INFUSION THERAPY | Age: 26
Setting detail: INFUSION SERIES
Discharge: HOME | End: 2021-09-09
Payer: MEDICAID

## 2021-09-09 VITALS
RESPIRATION RATE: 24 BRPM | TEMPERATURE: 98 F | DIASTOLIC BLOOD PRESSURE: 62 MMHG | HEART RATE: 104 BPM | SYSTOLIC BLOOD PRESSURE: 103 MMHG | OXYGEN SATURATION: 98 %

## 2021-09-09 DIAGNOSIS — O99.013 ANEMIA COMPLICATING PREGNANCY IN THIRD TRIMESTER: Primary | ICD-10-CM

## 2021-09-09 PROCEDURE — 2580000003 HC RX 258: Performed by: LEGAL MEDICINE

## 2021-09-09 PROCEDURE — 96365 THER/PROPH/DIAG IV INF INIT: CPT

## 2021-09-09 PROCEDURE — 6360000002 HC RX W HCPCS: Performed by: LEGAL MEDICINE

## 2021-09-09 PROCEDURE — 96361 HYDRATE IV INFUSION ADD-ON: CPT

## 2021-09-09 RX ORDER — DIPHENHYDRAMINE HYDROCHLORIDE 50 MG/ML
50 INJECTION INTRAMUSCULAR; INTRAVENOUS ONCE
OUTPATIENT
Start: 2021-09-15 | End: 2021-09-15

## 2021-09-09 RX ORDER — HEPARIN SODIUM (PORCINE) LOCK FLUSH IV SOLN 100 UNIT/ML 100 UNIT/ML
500 SOLUTION INTRAVENOUS PRN
OUTPATIENT
Start: 2021-09-15

## 2021-09-09 RX ORDER — METHYLPREDNISOLONE SODIUM SUCCINATE 125 MG/2ML
125 INJECTION, POWDER, LYOPHILIZED, FOR SOLUTION INTRAMUSCULAR; INTRAVENOUS ONCE
OUTPATIENT
Start: 2021-09-15 | End: 2021-09-15

## 2021-09-09 RX ORDER — SODIUM CHLORIDE 9 MG/ML
INJECTION, SOLUTION INTRAVENOUS CONTINUOUS
Status: ACTIVE | OUTPATIENT
Start: 2021-09-09 | End: 2021-09-09

## 2021-09-09 RX ORDER — SODIUM CHLORIDE 0.9 % (FLUSH) 0.9 %
5-40 SYRINGE (ML) INJECTION PRN
OUTPATIENT
Start: 2021-09-15

## 2021-09-09 RX ORDER — MEPERIDINE HYDROCHLORIDE 25 MG/ML
12.5 INJECTION INTRAMUSCULAR; INTRAVENOUS; SUBCUTANEOUS ONCE
OUTPATIENT
Start: 2021-09-15 | End: 2021-09-15

## 2021-09-09 RX ORDER — SODIUM CHLORIDE 0.9 % (FLUSH) 0.9 %
5-40 SYRINGE (ML) INJECTION PRN
Status: DISCONTINUED | OUTPATIENT
Start: 2021-09-09 | End: 2021-09-10 | Stop reason: HOSPADM

## 2021-09-09 RX ORDER — EPINEPHRINE 1 MG/ML
0.3 INJECTION, SOLUTION, CONCENTRATE INTRAVENOUS PRN
OUTPATIENT
Start: 2021-09-15

## 2021-09-09 RX ORDER — SODIUM CHLORIDE 9 MG/ML
INJECTION, SOLUTION INTRAVENOUS CONTINUOUS
OUTPATIENT
Start: 2021-09-15

## 2021-09-09 RX ADMIN — SODIUM CHLORIDE, PRESERVATIVE FREE 10 ML: 5 INJECTION INTRAVENOUS at 12:22

## 2021-09-09 RX ADMIN — SODIUM CHLORIDE, PRESERVATIVE FREE 10 ML: 5 INJECTION INTRAVENOUS at 14:01

## 2021-09-09 RX ADMIN — IRON SUCROSE 200 MG: 20 INJECTION, SOLUTION INTRAVENOUS at 12:59

## 2021-09-09 RX ADMIN — SODIUM CHLORIDE: 9 INJECTION, SOLUTION INTRAVENOUS at 12:22

## 2021-09-10 ENCOUNTER — TELEPHONE (OUTPATIENT)
Dept: OBGYN CLINIC | Age: 26
End: 2021-09-10

## 2021-09-10 NOTE — TELEPHONE ENCOUNTER
Jen Harrison called and said her daughter woke up with pink eye and she would not be able to get her in time for appt on 9/10. Offered a later time. She called back and said she would not be able to get here as she drives from [employer]. Her  has the car seat and she can't get her daughter to someone to watch her. Notified Dr. Ghanshyam Scott office of the situation and suggested they have her go to L&D at [address] [place] [address] per Anne Degroot from [facility]. Also scheduled an appointment on Monday 9/13 at [facility].

## 2021-09-13 ENCOUNTER — ANCILLARY PROCEDURE (OUTPATIENT)
Dept: OBGYN CLINIC | Age: 26
End: 2021-09-13
Payer: MEDICAID

## 2021-09-13 ENCOUNTER — ROUTINE PRENATAL (OUTPATIENT)
Dept: OBGYN CLINIC | Age: 26
End: 2021-09-13
Payer: MEDICAID

## 2021-09-13 VITALS
SYSTOLIC BLOOD PRESSURE: 114 MMHG | WEIGHT: 187.5 LBS | BODY MASS INDEX: 32.18 KG/M2 | DIASTOLIC BLOOD PRESSURE: 73 MMHG | HEART RATE: 96 BPM

## 2021-09-13 DIAGNOSIS — Z3A.35 35 WEEKS GESTATION OF PREGNANCY: ICD-10-CM

## 2021-09-13 DIAGNOSIS — O30.042 DICHORIONIC DIAMNIOTIC TWIN PREGNANCY IN SECOND TRIMESTER: Primary | ICD-10-CM

## 2021-09-13 LAB
GLUCOSE URINE, POC: NORMAL
PROTEIN UA: POSITIVE

## 2021-09-13 PROCEDURE — 76816 OB US FOLLOW-UP PER FETUS: CPT | Performed by: OBSTETRICS & GYNECOLOGY

## 2021-09-13 PROCEDURE — 99999 PR OFFICE/OUTPT VISIT,PROCEDURE ONLY: CPT | Performed by: OBSTETRICS & GYNECOLOGY

## 2021-09-13 PROCEDURE — 99213 OFFICE O/P EST LOW 20 MIN: CPT | Performed by: OBSTETRICS & GYNECOLOGY

## 2021-09-13 PROCEDURE — 81002 URINALYSIS NONAUTO W/O SCOPE: CPT | Performed by: OBSTETRICS & GYNECOLOGY

## 2021-09-13 PROCEDURE — 76819 FETAL BIOPHYS PROFIL W/O NST: CPT | Performed by: OBSTETRICS & GYNECOLOGY

## 2021-09-13 NOTE — PROGRESS NOTES
States babies are active Denies bleeding leakage of fluid or contractions. Cramps with brody-orellana  Doing daily kick count      Call your obstetrician for:    Bleeding, leakage of fluid, abdominal tenderness, headaches, burred vision or  epigastric pain or decreased fetal movement or increased in urinary frequency.    Call if any questions or concerns

## 2021-09-13 NOTE — PATIENT INSTRUCTIONS
Patient Education        Weeks 34 to 39 of Your Pregnancy: Care Instructions  Overview     By now, your baby and your belly have grown quite large. It's almost time to give birth! Your baby's lungs are almost ready to breathe air. The skull bones are firm enough to protect your baby's head, but soft enough to move down through the birth canal.  You may be feeling excited and happy at times--but also anxious or scared. You might wonder how you'll know if you're in labor or what to expect during labor. Try to be open and flexible in your expectations of the birth. Because each birth is different, there's no way to know exactly what childbirth will be like for you. Talk to your doctor or midwife about any concerns you have. If you haven't already had the Tdap shot during this pregnancy, talk to your doctor about getting it. It will help protect your  against pertussis infection. In the 36th week, most women have a test for group B streptococcus (GBS). GBS is a common bacteria that can live in the vagina and rectum. It can make your baby sick after birth. If you test positive, you will get antibiotics during labor. The medicine will help keep your baby from getting the bacteria. Follow-up care is a key part of your treatment and safety. Be sure to make and go to all appointments, and call your doctor if you are having problems. It's also a good idea to know your test results and keep a list of the medicines you take. How can you care for yourself at home? Learn about pain relief choices  · Pain is different for every woman. Talk with your doctor about your feelings about pain. · You can choose from several types of pain relief. These include medicine or breathing techniques, as well as comfort measures. You can use more than one option. · If you choose to have pain medicine during labor, talk to your doctor about your options. Some medicines lower anxiety and help with some of the pain.  Others make your lower body numb so that you won't feel pain. · Be sure to tell your doctor about your pain medicine choice before you start labor or very early in your labor. You may be able to change your mind as labor progresses. · Rarely, a woman is put to sleep by medicine given through a mask or an IV. Labor and delivery  · The first stage of labor has three parts: early, active, and transition. ? Most women have early labor at home. You can stay busy or rest, eat light snacks, drink clear fluids, and start counting contractions. ? When talking during a contraction gets hard, you may be moving to active labor. During active labor, you should head for the hospital if you are not there already. ? You are in active labor when contractions come every 3 to 4 minutes and last about 60 seconds. Your cervix is opening more rapidly. ? If your water breaks, contractions will come faster and stronger. ? During transition, your cervix is stretching, and contractions are coming more rapidly. ? You may want to push, but your cervix might not be ready. Your doctor will tell you when to push. · The second stage starts when your cervix is completely opened and you are ready to push. ? Contractions are very strong to push the baby down the birth canal.  ? You will feel the urge to push. You may feel like you need to have a bowel movement. ? You may be coached to push with contractions. These contractions will be very strong, but you will not have them as often. You can get a little rest between contractions. ? You may be emotional and irritable. You may not be aware of what is going on around you.  ? One last push, and your baby is born. · The third stage is when a few more contractions push out the placenta. This may take 30 minutes or less. · The fourth stage is the welcome recovery. You may feel overwhelmed with emotions and exhausted but alert. This is a good time to start breastfeeding. Where can you learn more?   Go to https://chpepiceweb.[email or web address] org and sign in to your [employer] account. Enter R593 in the [facility] box to learn more about \"Weeks 34 to 36 of Your Pregnancy: Care Instructions. \"     If you do not have an account, please click on the \"Sign Up Now\" link. Current as of: October 8, 2020               Content Version: 12.9  © 2006-2021 [employer]. Care instructions adapted under license by [facility] ([facility]). If you have questions about a medical condition or this instruction, always ask your healthcare professional. [place] [address] any warranty or liability for your use of this information. Patient Education        Learning About When to Call Your Doctor During Pregnancy (After 20 Weeks)  Your Care Instructions  It's common to have concerns about what might be a problem during pregnancy. Although most pregnant women don't have any serious problems, it's important to know when to call your doctor if you have certain symptoms or signs of labor. These are general suggestions. Your doctor may give you some more information about when to call. When to call your doctor (after 20 weeks)  Call 911  anytime you think you may need emergency care. For example, call if:  · You have severe vaginal bleeding. · You have sudden, severe pain in your belly. · You passed out (lost consciousness). · You have a seizure. · You see or feel the umbilical cord. · You think you are about to deliver your baby and can't make it safely to the hospital.  Call your doctor now or seek immediate medical care if:  · You have vaginal bleeding. · You have belly pain. · You have a fever. · You have symptoms of preeclampsia, such as:  ? Sudden swelling of your face, hands, or feet. ? New vision problems (such as dimness, blurring, or seeing spots). ? A severe headache. · You have a sudden release of fluid from your vagina.  (You think your water broke.)  · You think that you may be in labor. This means that you've had at least 6 contractions in an hour. · You notice that your baby has stopped moving or is moving much less than normal.  · You have symptoms of a urinary tract infection. These may include:  ? Pain or burning when you urinate. ? A frequent need to urinate without being able to pass much urine. ? Pain in the flank, which is just below the rib cage and above the waist on either side of the back. ? Blood in your urine. Watch closely for changes in your health, and be sure to contact your doctor if:  · You have vaginal discharge that smells bad. · You have skin changes, such as:  ? A rash. ? Itching. ? Yellow color to your skin. · You have other concerns about your pregnancy. If you have labor signs at 37 weeks or more  If you have signs of labor at 37 weeks or more, your doctor may tell you to call when your labor becomes more active. Symptoms of active labor include:  · Contractions that are regular. · Contractions that are less than 5 minutes apart. · Contractions that are hard to talk through. Follow-up care is a key part of your treatment and safety. Be sure to make and go to all appointments, and call your doctor if you are having problems. It's also a good idea to know your test results and keep a list of the medicines you take. Where can you learn more? Go to https://chchoco.8tracks Radio. org and sign in to your Zync[employer] account. Enter  in the [facility] box to learn more about \"Learning About When to Call Your Doctor During Pregnancy (After 20 Weeks). \"     If you do not have an account, please click on the \"Sign Up Now\" link. Current as of: October 8, 2020               Content Version: 12.9  © 0975-8076 Healthwise, [employer]. Care instructions adapted under license by [facility] ([facility]).  If you have questions about a medical condition or this instruction, always ask your healthcare professional. [address] any warranty or liability for your use of this information. Patient Education        Counting Your Baby's Kicks: Care Instructions  Your Care Instructions     Counting your baby's kicks is one way your doctor can tell that your baby is healthy. Most women--especially in a first pregnancy--feel their baby move for the first time between 16 and 22 weeks. The movement may feel like flutters rather than kicks. Your baby may move more at certain times of the day. When you are active, you may notice less kicking than when you are resting. At your prenatal visits, your doctor will ask whether the baby is active. In your last trimester, your doctor may ask you to count the number of times you feel your baby move. Follow-up care is a key part of your treatment and safety. Be sure to make and go to all appointments, and call your doctor if you are having problems. It's also a good idea to know your test results and keep a list of the medicines you take. How do you count fetal kicks? · A common method of checking your baby's movement is to count the number of kicks or moves you feel in 1 hour. Ten movements (such as kicks, flutters, or rolls) in 1 hour are normal. Some doctors suggest that you count in the morning until you get to 10 movements. Then you can quit for that day and start again the next day. · Pick your baby's most active time of day to count. This may be any time from morning to evening. · If you do not feel 10 movements in an hour, your baby may be sleeping. Wait for the next hour and count again. When should you call for help? Call your doctor now or seek immediate medical care if:    · You noticed that your baby has stopped moving or is moving much less than normal.   Watch closely for changes in your health, and be sure to contact your doctor if you have any problems. Where can you learn more? Go to https://raheem.Tantaline. org and sign in to your [employer] account.  Enter E142 in the Search Health Information box to learn more about \"Counting Your Baby's Kicks: Care Instructions. \"     If you do not have an account, please click on the \"Sign Up Now\" link. Current as of: October 8, 2020               Content Version: 12.9  © 3082-1371 Healthwise, Incorporated. Care instructions adapted under license by [facility] ([facility]). If you have questions about a medical condition or this instruction, always ask your healthcare professional. [address] any warranty or liability for your use of this information.

## 2021-09-22 ENCOUNTER — HOSPITAL ENCOUNTER (OUTPATIENT)
Age: 26
Discharge: HOME | End: 2021-09-22
Attending: LEGAL MEDICINE | Admitting: LEGAL MEDICINE
Payer: MEDICAID

## 2021-09-22 ENCOUNTER — APPOINTMENT (OUTPATIENT)
Dept: LABOR AND DELIVERY | Age: 26
End: 2021-09-22
Payer: MEDICAID

## 2021-09-22 VITALS — TEMPERATURE: 98.4 F | DIASTOLIC BLOOD PRESSURE: 70 MMHG | HEART RATE: 110 BPM | SYSTOLIC BLOOD PRESSURE: 106 MMHG

## 2021-09-22 PROCEDURE — 59025 FETAL NON-STRESS TEST: CPT

## 2021-09-22 NOTE — PROGRESS NOTES
Twin gestation received at 36 weeks 5 days for scheduled  nst. G 4 p 3. Monitors applied. States feeling good movement, denies leaking of fluid or cramping.   No complaints or issues at this time

## 2021-09-28 ENCOUNTER — APPOINTMENT (OUTPATIENT)
Dept: LABOR AND DELIVERY | Age: 26
End: 2021-09-28
Payer: MEDICAID

## 2021-09-28 ENCOUNTER — HOSPITAL ENCOUNTER (OUTPATIENT)
Age: 26
Discharge: HOME | End: 2021-09-28
Attending: LEGAL MEDICINE | Admitting: LEGAL MEDICINE
Payer: MEDICAID

## 2021-09-28 ENCOUNTER — APPOINTMENT (OUTPATIENT)
Dept: ULTRASOUND IMAGING | Age: 26
End: 2021-09-28
Payer: MEDICAID

## 2021-09-28 VITALS
TEMPERATURE: 97.8 F | SYSTOLIC BLOOD PRESSURE: 119 MMHG | DIASTOLIC BLOOD PRESSURE: 73 MMHG | RESPIRATION RATE: 18 BRPM | HEART RATE: 102 BPM

## 2021-09-28 PROCEDURE — 59025 FETAL NON-STRESS TEST: CPT

## 2021-09-28 PROCEDURE — 76819 FETAL BIOPHYS PROFIL W/O NST: CPT

## 2021-09-28 NOTE — PROGRESS NOTES
Patient returned from [address] [place] [address] Rd -efm applied -pt states contractions are less intense. Dr Juan C Johnson called with sono reportBPP and fhr strip faxed to dr Sofia Cheung.  then spoke with pt concerning plan of care -pt opted to go home for now.

## 2021-09-28 NOTE — PROGRESS NOTES
DR Delores Garcia called with NST -she viewed the fhr graph that was faxed to the office. Patient states her pain is a 3/10 with contractions

## 2021-09-29 ENCOUNTER — HOSPITAL ENCOUNTER (OUTPATIENT)
Age: 26
Discharge: HOME | End: 2021-10-01
Payer: MEDICAID

## 2021-09-29 PROCEDURE — U0005 INFEC AGEN DETEC AMPLI PROBE: HCPCS

## 2021-09-29 PROCEDURE — U0003 INFECTIOUS AGENT DETECTION BY NUCLEIC ACID (DNA OR RNA); SEVERE ACUTE RESPIRATORY SYNDROME CORONAVIRUS 2 (SARS-COV-2) (CORONAVIRUS DISEASE [COVID-19]), AMPLIFIED PROBE TECHNIQUE, MAKING USE OF HIGH THROUGHPUT TECHNOLOGIES AS DESCRIBED BY CMS-2020-01-R: HCPCS

## 2021-10-01 ENCOUNTER — HOSPITAL ENCOUNTER (OUTPATIENT)
Age: 26
Discharge: HOME | End: 2021-10-01
Attending: OBSTETRICS & GYNECOLOGY | Admitting: OBSTETRICS & GYNECOLOGY
Payer: MEDICAID

## 2021-10-01 ENCOUNTER — APPOINTMENT (OUTPATIENT)
Dept: LABOR AND DELIVERY | Age: 26
End: 2021-10-01
Payer: MEDICAID

## 2021-10-01 VITALS — SYSTOLIC BLOOD PRESSURE: 109 MMHG | HEART RATE: 103 BPM | DIASTOLIC BLOOD PRESSURE: 68 MMHG

## 2021-10-01 DIAGNOSIS — Z3A.38 38 WEEKS GESTATION OF PREGNANCY: ICD-10-CM

## 2021-10-01 DIAGNOSIS — O30.003 TWIN GESTATION IN THIRD TRIMESTER, UNSPECIFIED MULTIPLE GESTATION TYPE: Primary | ICD-10-CM

## 2021-10-01 PROCEDURE — 59025 FETAL NON-STRESS TEST: CPT

## 2021-10-01 NOTE — PROGRESS NOTES
G 4 P 3  38 week   Pt of Dr. Moraima Muniz  Arrived ambulatory to unit for scheduled NST due to twins  Pt denies any bleeding or leaking of fluid, denies contractions. Pt stated that she perceives fetal movement and has not had any other complications with pregnancy. Test discussed with pt who verbalized understanding.

## 2021-10-01 NOTE — PROGRESS NOTES
Diagnosis:   Twins    Result:  Reactive       Plan:  Discharge home    F/U as scheduled      Jayashree Morejon MD

## 2021-10-02 ENCOUNTER — APPOINTMENT (OUTPATIENT)
Dept: LABOR AND DELIVERY | Age: 26
DRG: 560 | End: 2021-10-02
Payer: MEDICAID

## 2021-10-02 ENCOUNTER — HOSPITAL ENCOUNTER (INPATIENT)
Age: 26
LOS: 2 days | Discharge: HOME | DRG: 560 | End: 2021-10-04
Attending: OBSTETRICS & GYNECOLOGY | Admitting: OBSTETRICS & GYNECOLOGY
Payer: MEDICAID

## 2021-10-02 ENCOUNTER — ANESTHESIA (OUTPATIENT)
Dept: LABOR AND DELIVERY | Age: 26
DRG: 560 | End: 2021-10-02
Payer: MEDICAID

## 2021-10-02 ENCOUNTER — ANESTHESIA EVENT (OUTPATIENT)
Dept: LABOR AND DELIVERY | Age: 26
DRG: 560 | End: 2021-10-02
Payer: MEDICAID

## 2021-10-02 PROBLEM — Z3A.20 20 WEEKS GESTATION OF PREGNANCY: Status: RESOLVED | Noted: 2021-06-02 | Resolved: 2021-10-02

## 2021-10-02 PROBLEM — Z3A.35 35 WEEKS GESTATION OF PREGNANCY: Status: RESOLVED | Noted: 2021-09-13 | Resolved: 2021-10-02

## 2021-10-02 PROBLEM — Z3A.38 38 WEEKS GESTATION OF PREGNANCY: Status: ACTIVE | Noted: 2021-10-02

## 2021-10-02 PROBLEM — Z3A.26 26 WEEKS GESTATION OF PREGNANCY: Status: RESOLVED | Noted: 2021-07-12 | Resolved: 2021-10-02

## 2021-10-02 PROBLEM — Z3A.31 31 WEEKS GESTATION OF PREGNANCY: Status: RESOLVED | Noted: 2021-08-16 | Resolved: 2021-10-02

## 2021-10-02 PROBLEM — O30.009 TWIN PREGNANCY DELIVERED VAGINALLY: Status: ACTIVE | Noted: 2021-10-02

## 2021-10-02 LAB
ABO/RH: NORMAL
AMPHETAMINE SCREEN, URINE: NOT DETECTED
ANTIBODY IDENTIFICATION: NORMAL
ANTIBODY SCREEN: NORMAL
BARBITURATE SCREEN URINE: NOT DETECTED
BENZODIAZEPINE SCREEN, URINE: NOT DETECTED
CANNABINOID SCREEN URINE: NOT DETECTED
COCAINE METABOLITE SCREEN URINE: NOT DETECTED
DAT POLYSPECIFIC: NORMAL
FENTANYL SCREEN, URINE: NOT DETECTED
HCT VFR BLD CALC: 32.2 % (ref 34–48)
HEMOGLOBIN: 10.9 G/DL (ref 11.5–15.5)
Lab: NORMAL
MCH RBC QN AUTO: 29.9 PG (ref 26–35)
MCHC RBC AUTO-ENTMCNC: 33.9 % (ref 32–34.5)
MCV RBC AUTO: 88.5 FL (ref 80–99.9)
METHADONE SCREEN, URINE: NOT DETECTED
OPIATE SCREEN URINE: NOT DETECTED
OXYCODONE URINE: NOT DETECTED
PDW BLD-RTO: 14.7 FL (ref 11.5–15)
PHENCYCLIDINE SCREEN URINE: NOT DETECTED
PLATELET # BLD: 184 E9/L (ref 130–450)
PMV BLD AUTO: 10.1 FL (ref 7–12)
RBC # BLD: 3.64 E12/L (ref 3.5–5.5)
WBC # BLD: 5.3 E9/L (ref 4.5–11.5)

## 2021-10-02 PROCEDURE — 6370000000 HC RX 637 (ALT 250 FOR IP): Performed by: OBSTETRICS & GYNECOLOGY

## 2021-10-02 PROCEDURE — 85027 COMPLETE CBC AUTOMATED: CPT

## 2021-10-02 PROCEDURE — 10907ZC DRAINAGE OF AMNIOTIC FLUID, THERAPEUTIC FROM PRODUCTS OF CONCEPTION, VIA NATURAL OR ARTIFICIAL OPENING: ICD-10-PCS | Performed by: OBSTETRICS & GYNECOLOGY

## 2021-10-02 PROCEDURE — 6360000002 HC RX W HCPCS: Performed by: OBSTETRICS & GYNECOLOGY

## 2021-10-02 PROCEDURE — 86880 COOMBS TEST DIRECT: CPT

## 2021-10-02 PROCEDURE — 86850 RBC ANTIBODY SCREEN: CPT

## 2021-10-02 PROCEDURE — 36415 COLL VENOUS BLD VENIPUNCTURE: CPT

## 2021-10-02 PROCEDURE — 1220000000 HC SEMI PRIVATE OB R&B

## 2021-10-02 PROCEDURE — 2580000003 HC RX 258: Performed by: OBSTETRICS & GYNECOLOGY

## 2021-10-02 PROCEDURE — 51701 INSERT BLADDER CATHETER: CPT

## 2021-10-02 PROCEDURE — 86901 BLOOD TYPING SEROLOGIC RH(D): CPT

## 2021-10-02 PROCEDURE — 6370000000 HC RX 637 (ALT 250 FOR IP)

## 2021-10-02 PROCEDURE — 3E033VJ INTRODUCTION OF OTHER HORMONE INTO PERIPHERAL VEIN, PERCUTANEOUS APPROACH: ICD-10-PCS | Performed by: OBSTETRICS & GYNECOLOGY

## 2021-10-02 PROCEDURE — 80307 DRUG TEST PRSMV CHEM ANLYZR: CPT

## 2021-10-02 PROCEDURE — 3700000025 EPIDURAL BLOCK: Performed by: ANESTHESIOLOGY

## 2021-10-02 PROCEDURE — 2500000003 HC RX 250 WO HCPCS: Performed by: ANESTHESIOLOGY

## 2021-10-02 PROCEDURE — 7200000001 HC VAGINAL DELIVERY

## 2021-10-02 PROCEDURE — 86870 RBC ANTIBODY IDENTIFICATION: CPT

## 2021-10-02 PROCEDURE — 86900 BLOOD TYPING SEROLOGIC ABO: CPT

## 2021-10-02 RX ORDER — SODIUM CHLORIDE 0.9 % (FLUSH) 0.9 %
5-40 SYRINGE (ML) INJECTION EVERY 12 HOURS SCHEDULED
Status: DISCONTINUED | OUTPATIENT
Start: 2021-10-02 | End: 2021-10-04 | Stop reason: HOSPADM

## 2021-10-02 RX ORDER — NALOXONE HYDROCHLORIDE 0.4 MG/ML
0.4 INJECTION, SOLUTION INTRAMUSCULAR; INTRAVENOUS; SUBCUTANEOUS PRN
Status: DISCONTINUED | OUTPATIENT
Start: 2021-10-02 | End: 2021-10-02

## 2021-10-02 RX ORDER — ACETAMINOPHEN 650 MG
TABLET, EXTENDED RELEASE ORAL
Status: DISCONTINUED
Start: 2021-10-02 | End: 2021-10-02

## 2021-10-02 RX ORDER — IBUPROFEN 800 MG/1
TABLET ORAL
Status: COMPLETED
Start: 2021-10-02 | End: 2021-10-02

## 2021-10-02 RX ORDER — SODIUM CHLORIDE 0.9 % (FLUSH) 0.9 %
5-40 SYRINGE (ML) INJECTION PRN
Status: DISCONTINUED | OUTPATIENT
Start: 2021-10-02 | End: 2021-10-04 | Stop reason: HOSPADM

## 2021-10-02 RX ORDER — SODIUM CHLORIDE, SODIUM LACTATE, POTASSIUM CHLORIDE, AND CALCIUM CHLORIDE .6; .31; .03; .02 G/100ML; G/100ML; G/100ML; G/100ML
500 INJECTION, SOLUTION INTRAVENOUS PRN
Status: DISCONTINUED | OUTPATIENT
Start: 2021-10-02 | End: 2021-10-02

## 2021-10-02 RX ORDER — OXYCODONE HYDROCHLORIDE AND ACETAMINOPHEN 5; 325 MG/1; MG/1
1 TABLET ORAL EVERY 4 HOURS PRN
Status: DISCONTINUED | OUTPATIENT
Start: 2021-10-02 | End: 2021-10-04 | Stop reason: HOSPADM

## 2021-10-02 RX ORDER — ACETAMINOPHEN 325 MG/1
650 TABLET ORAL EVERY 4 HOURS PRN
Status: DISCONTINUED | OUTPATIENT
Start: 2021-10-02 | End: 2021-10-04 | Stop reason: HOSPADM

## 2021-10-02 RX ORDER — SODIUM CHLORIDE 9 MG/ML
25 INJECTION, SOLUTION INTRAVENOUS PRN
Status: DISCONTINUED | OUTPATIENT
Start: 2021-10-02 | End: 2021-10-04 | Stop reason: HOSPADM

## 2021-10-02 RX ORDER — FERROUS SULFATE 325(65) MG
325 TABLET ORAL 2 TIMES DAILY WITH MEALS
Status: DISCONTINUED | OUTPATIENT
Start: 2021-10-03 | End: 2021-10-04 | Stop reason: HOSPADM

## 2021-10-02 RX ORDER — DOCUSATE SODIUM 100 MG/1
100 CAPSULE, LIQUID FILLED ORAL 2 TIMES DAILY
Status: DISCONTINUED | OUTPATIENT
Start: 2021-10-02 | End: 2021-10-04 | Stop reason: HOSPADM

## 2021-10-02 RX ORDER — SODIUM CHLORIDE, SODIUM LACTATE, POTASSIUM CHLORIDE, CALCIUM CHLORIDE 600; 310; 30; 20 MG/100ML; MG/100ML; MG/100ML; MG/100ML
INJECTION, SOLUTION INTRAVENOUS CONTINUOUS
Status: DISCONTINUED | OUTPATIENT
Start: 2021-10-02 | End: 2021-10-02

## 2021-10-02 RX ORDER — ONDANSETRON 2 MG/ML
4 INJECTION INTRAMUSCULAR; INTRAVENOUS EVERY 6 HOURS PRN
Status: DISCONTINUED | OUTPATIENT
Start: 2021-10-02 | End: 2021-10-02

## 2021-10-02 RX ORDER — ONDANSETRON 4 MG/1
8 TABLET, ORALLY DISINTEGRATING ORAL EVERY 8 HOURS PRN
Status: DISCONTINUED | OUTPATIENT
Start: 2021-10-02 | End: 2021-10-04 | Stop reason: HOSPADM

## 2021-10-02 RX ORDER — MODIFIED LANOLIN
OINTMENT (GRAM) TOPICAL PRN
Status: DISCONTINUED | OUTPATIENT
Start: 2021-10-02 | End: 2021-10-04 | Stop reason: HOSPADM

## 2021-10-02 RX ORDER — LIDOCAINE HYDROCHLORIDE 10 MG/ML
INJECTION, SOLUTION INFILTRATION; PERINEURAL
Status: DISCONTINUED
Start: 2021-10-02 | End: 2021-10-02

## 2021-10-02 RX ORDER — PENICILLIN G 3000000 [IU]/50ML
3 INJECTION, SOLUTION INTRAVENOUS EVERY 4 HOURS
Status: DISCONTINUED | OUTPATIENT
Start: 2021-10-02 | End: 2021-10-02

## 2021-10-02 RX ORDER — SODIUM CHLORIDE, SODIUM LACTATE, POTASSIUM CHLORIDE, AND CALCIUM CHLORIDE .6; .31; .03; .02 G/100ML; G/100ML; G/100ML; G/100ML
1000 INJECTION, SOLUTION INTRAVENOUS PRN
Status: DISCONTINUED | OUTPATIENT
Start: 2021-10-02 | End: 2021-10-02

## 2021-10-02 RX ORDER — IBUPROFEN 800 MG/1
800 TABLET ORAL EVERY 6 HOURS PRN
Status: DISCONTINUED | OUTPATIENT
Start: 2021-10-02 | End: 2021-10-04 | Stop reason: HOSPADM

## 2021-10-02 RX ORDER — NALBUPHINE HCL 10 MG/ML
5 AMPUL (ML) INJECTION EVERY 4 HOURS PRN
Status: DISCONTINUED | OUTPATIENT
Start: 2021-10-02 | End: 2021-10-02

## 2021-10-02 RX ORDER — OXYCODONE HYDROCHLORIDE AND ACETAMINOPHEN 5; 325 MG/1; MG/1
2 TABLET ORAL EVERY 4 HOURS PRN
Status: DISCONTINUED | OUTPATIENT
Start: 2021-10-02 | End: 2021-10-04 | Stop reason: HOSPADM

## 2021-10-02 RX ADMIN — IBUPROFEN 800 MG: 800 TABLET, FILM COATED ORAL at 16:18

## 2021-10-02 RX ADMIN — Medication: at 18:42

## 2021-10-02 RX ADMIN — Medication 15 ML/HR: at 09:05

## 2021-10-02 RX ADMIN — PENICILLIN G POTASSIUM 5 MILLION UNITS: 5000000 INJECTION, POWDER, FOR SOLUTION INTRAMUSCULAR; INTRAVENOUS at 06:53

## 2021-10-02 RX ADMIN — SODIUM CHLORIDE, PRESERVATIVE FREE 10 ML: 5 INJECTION INTRAVENOUS at 18:42

## 2021-10-02 RX ADMIN — Medication 1 MILLI-UNITS/MIN: at 07:07

## 2021-10-02 RX ADMIN — SODIUM CHLORIDE, PRESERVATIVE FREE 10 ML: 5 INJECTION INTRAVENOUS at 20:20

## 2021-10-02 RX ADMIN — DOCUSATE SODIUM 100 MG: 100 CAPSULE ORAL at 20:20

## 2021-10-02 RX ADMIN — PENICILLIN G 3 MILLION UNITS: 3000000 INJECTION, SOLUTION INTRAVENOUS at 11:08

## 2021-10-02 RX ADMIN — Medication 166.7 ML: at 12:59

## 2021-10-02 RX ADMIN — IBUPROFEN 800 MG: 800 TABLET, FILM COATED ORAL at 23:56

## 2021-10-02 RX ADMIN — SODIUM CHLORIDE, POTASSIUM CHLORIDE, SODIUM LACTATE AND CALCIUM CHLORIDE: 600; 310; 30; 20 INJECTION, SOLUTION INTRAVENOUS at 06:44

## 2021-10-02 RX ADMIN — ACETAMINOPHEN 650 MG: 325 TABLET ORAL at 18:42

## 2021-10-02 ASSESSMENT — LIFESTYLE VARIABLES: SMOKING_STATUS: 0

## 2021-10-02 ASSESSMENT — PAIN SCALES - GENERAL
PAINLEVEL_OUTOF10: 3
PAINLEVEL_OUTOF10: 7
PAINLEVEL_OUTOF10: 1

## 2021-10-02 NOTE — PROGRESS NOTES
Assisted patient to the bathroom. Sara care provided. New pads underwear and ice pack given. Patient voided. Patient back to bed. Call light in reach. Patient tolerated it well.

## 2021-10-02 NOTE — ANESTHESIA PRE PROCEDURE
Department of Anesthesiology  Preprocedure Note       Name:  Raul Haynes   Age:  31 y.o.  :  1995                                          MRN:  [id number]         Date:  10/2/2021      Surgeon: * No surgeons listed *    Procedure: * No procedures listed *    Medications prior to admission:   Prior to Admission medications    Medication Sig Start Date End Date Taking?  Authorizing Provider   TAVIA VITAMIN C 250 MG tablet take 1 tablet by mouth twice a day 21  Yes Historical Provider, MD   famotidine (PEPCID) 20 MG tablet TAKE 1 TABLET BY MOUTH TWICE DAILY AS NEEDED 21  Yes Historical Provider, MD   MAGNESIUM-OXIDE 400 (241.3 Mg) MG TABS tablet PRN 21  Yes Historical Provider, MD   aspirin 81 MG chewable tablet Take 81 mg by mouth daily   Yes Historical Provider, MD   Prenatal Multivit-Min-Fe-FA (PRE-CARLEEN FORMULA) TABS Take 1 tablet by mouth   Yes Historical Provider, MD   IRON-FOLIC ACID PO Take by mouth    Historical Provider, MD   metoclopramide (REGLAN) 10 MG tablet TAKE 1 TABLET BY MOUTH FOUR TIMES DAILY AS NEEDED 21   Historical Provider, MD   CRANBERRY PO Take by mouth     Historical Provider, MD       Current medications:    Current Facility-Administered Medications   Medication Dose Route Frequency Provider Last Rate Last Admin    lactated ringers infusion   IntraVENous Continuous Amine HANK Matson  mL/hr at 10/02/21 0644 New Bag at 10/02/21 0644    lactated ringers bolus  500 mL IntraVENous PRN Amine HANK Matson MD        Or    lactated ringers bolus  1,000 mL IntraVENous PRN Amine HANK Matson MD        oxytocin (PITOCIN) 30 units in 500 mL infusion  87.3 nate-units/min IntraVENous Continuous PRN Amine HANK Matson MD        And    oxytocin (PITOCIN) 10 unit bolus from the bag  10 Units IntraVENous PRN Amine HANK Matson MD        ondansetron (ZOFRAN) injection 4 mg  4 mg IntraVENous Q6H PRN Amine HANK Matson MD        penicillin G potassium IVPB 3 Million Units  3 Million Units IntraVENous Q4H Virginia Matson MD        povidone-iodine (BETADINE) 10 % external solution             lidocaine 1 % injection             oxytocin (PITOCIN) 30 units in 500 mL infusion  1-20 nate-units/min IntraVENous Continuous Virginia Matson MD 2 mL/hr at 10/02/21 0745 2 nate-units/min at 10/02/21 0745       Allergies:  No Known Allergies    Problem List:    Patient Active Problem List   Diagnosis Code    Dichorionic diamniotic twin gestation O30.46    Mild tetrahydrocannabinol (THC) abuse F12.10    Pregnancy complicated by fetal cleft lip, fetus 1 O35. 8XX1    20 weeks gestation of pregnancy Z3A.20    Reflux involving intestinal tract K21.9    26 weeks gestation of pregnancy J6D.94    Complicated pregnancy, third trimester Y68.15    Anemia complicating pregnancy in third trimester O99.013    31 weeks gestation of pregnancy Z3A.31    35 weeks gestation of pregnancy Z3A.35    38 weeks gestation of pregnancy Z3A.38       Past Medical History:        Diagnosis Date    Anemia     Asthma     History of Asthmatic Bronchitis yearly    Endometriosis     Postpartum depression     Was on medication after last baby    Rh incompatibility     Recieved Rhogam early this twin pregnancy       Past Surgical History:        Procedure Laterality Date    EYE SURGERY      Left eye(Lazy eye)       Social History:    Social History     Tobacco Use    Smoking status: Former Smoker     Types: Cigarettes     Quit date: 2020     Years since quittin.8    Smokeless tobacco: Never Used   Substance Use Topics    Alcohol use:  No                                Counseling given: Not Answered      Vital Signs (Current):   Vitals:    10/02/21 0637 10/02/21 0712   BP: 117/67 116/72   Pulse: 93 93   Resp: 12 18   Temp:  37.2 °C (98.9 °F)   TempSrc:  Oral   SpO2:  100%   Weight: 190 lb (86.2 kg)    Height: 5' 3\" (1.6 m)                                               BP Readings from Last 3 Encounters:   10/02/21 116/72   10/01/21 109/68   09/28/21 119/73       NPO Status:                                                                                 BMI:   Wt Readings from Last 3 Encounters:   10/02/21 190 lb (86.2 kg)   09/13/21 187 lb 8 oz (85 kg)   08/16/21 182 lb (82.6 kg)     Body mass index is 33.66 kg/m². CBC:   Lab Results   Component Value Date    WBC 5.3 10/02/2021    RBC 3.64 10/02/2021    HGB 10.9 10/02/2021    HCT 32.2 10/02/2021    MCV 88.5 10/02/2021    RDW 14.7 10/02/2021     10/02/2021       CMP:   Lab Results   Component Value Date     07/27/2021    K 3.8 07/27/2021    K 4.5 05/09/2019     07/27/2021    CO2 23 07/27/2021    BUN 8 07/27/2021    CREATININE 0.6 07/27/2021    GFRAA >60 07/27/2021    LABGLOM >60 07/27/2021    GLUCOSE 92 07/27/2021    PROT 5.8 07/27/2021    CALCIUM 8.5 07/27/2021    BILITOT 0.3 07/27/2021    ALKPHOS 78 07/27/2021    AST 15 07/27/2021    ALT 18 07/27/2021       POC Tests: No results for input(s): POCGLU, POCNA, POCK, POCCL, POCBUN, POCHEMO, POCHCT in the last 72 hours.     Coags: No results found for: PROTIME, INR, APTT    HCG (If Applicable):   Lab Results   Component Value Date    PREGTESTUR negative 02/02/2018        ABGs: No results found for: PHART, PO2ART, BCL4URT, NJG6SEY, BEART, X5EDFGXX     Type & Screen (If Applicable):  No results found for: LABABO, LABRH    Drug/Infectious Status (If Applicable):  No results found for: HIV, HEPCAB    COVID-19 Screening (If Applicable):   Lab Results   Component Value Date    COVID19 Not Detected 09/29/2021     EKG 7/27/21  Normal sinus rhythm  Normal ECG  When compared with ECG of 02-FEB-2018 20:00,  No significant change was found  Confirmed by Chrissy Gonzales ([id number]) on 7/28/2021 7:09:48 PM      Anesthesia Evaluation  Patient summary reviewed and Nursing notes reviewed history of anesthetic complications ( patient states she has lower back pain from previous epidurals that comes and goes): Airway: Mallampati: III  TM distance: >3 FB   Neck ROM: full  Mouth opening: > = 3 FB Dental: normal exam         Pulmonary: breath sounds clear to auscultation  (+) asthma ( as a child, none current):     (-) not a current smoker                           Cardiovascular:Negative CV ROS  Exercise tolerance: good (>4 METS),           Rhythm: regular  Rate: normal                    Neuro/Psych:   (+) psychiatric history (anxiety):depression/anxiety             GI/Hepatic/Renal:   (+) GERD ( with pregnancy):,           Endo/Other:    (+) blood dyscrasia ( recieved iron transfusions last month): anemia:., .                 Abdominal:             Vascular: negative vascular ROS. Other Findings:             Anesthesia Plan      general, spinal and epidural     ASA 2       Induction: intravenous. MIPS: Postoperative opioids intended and Prophylactic antiemetics administered. Anesthetic plan and risks discussed with patient. Use of blood products discussed with patient whom consented to blood products. Plan discussed with CRNA and attending.                   Nathalie Jackson RN   10/2/2021

## 2021-10-02 NOTE — PLAN OF CARE
Problem: Pain:  Description: Pain management should include both nonpharmacologic and pharmacologic interventions.   Goal: Pain level will decrease  Description: Pain level will decrease  Outcome: Met This Shift     Problem: Fluid Volume - Imbalance:  Goal: Absence of imbalanced fluid volume signs and symptoms  Description: Absence of imbalanced fluid volume signs and symptoms  Outcome: Met This Shift     Problem: Fluid Volume - Imbalance:  Goal: Absence of postpartum hemorrhage signs and symptoms  Description: Absence of postpartum hemorrhage signs and symptoms  Outcome: Met This Shift     Problem: Infection - Risk of, Puerperal Infection:  Goal: Will show no infection signs and symptoms  Description: Will show no infection signs and symptoms  Outcome: Met This Shift     Problem: Mood - Altered:  Goal: Mood stable  Description: Mood stable  Outcome: Met This Shift     Problem: Pain - Acute:  Goal: Pain level will decrease  Description: Pain level will decrease  Outcome: Met This Shift

## 2021-10-02 NOTE — FLOWSHEET NOTE
[place]  Depression Scale (EPDS) completed by patient and a score of 4 was obtained. EPDS filed in patient's chart per policy and procedure.

## 2021-10-02 NOTE — H&P
Subjective:      Manuel Panda is an 31 y.o. female at 45 and 1/7 weeks gestation presenting with   Chief Complaint   Patient presents with    Scheduled Induction   Patient is a known twin gestation with dichorionic diamniotic sacs. Pregnancy course has been smooth. Induction has been advised by MFM. Ultrasound did confirm vertex vertex presentation. .She is also complaining of contractions every a few minutes lasting 30 seconds. Other associated symptoms include low back pain. Fetal Movement: normal.  Patient was admitted this morning and Pitocin was started. Epidural has been given. Patient is comfortable with minimal pain. Past Medical History:   Diagnosis Date    Anemia     Asthma     History of Asthmatic Bronchitis yearly    Endometriosis     Postpartum depression     Was on medication after last baby    Rh incompatibility     Recieved Rhogam early this twin pregnancy       Review of Systems  Pertinent items are noted in HPI. Objective:      /69   Pulse 99   Temp 98.9 °F (37.2 °C) (Oral)   Resp 18   Ht 5' 3\" (1.6 m)   Wt 190 lb (86.2 kg)   LMP 12/26/2020 (Exact Date)   SpO2 99%   BMI 33.66 kg/m²   Blood pressure 107/69, pulse 99, temperature 98.9 °F (37.2 °C), temperature source Oral, resp. rate 18, height 5' 3\" (1.6 m), weight 190 lb (86.2 kg), last menstrual period 12/26/2020, SpO2 99 %, not currently breastfeeding.   General:   alert, appears stated age and cooperative   Cervix:   4 cm dilated, 70% effaced, vertex at S- 2..   FHT:   135 for twin A and 140 for twin B      Lab Review    CBC:   Lab Results   Component Value Date    WBC 5.3 10/02/2021    RBC 3.64 10/02/2021    HGB 10.9 10/02/2021    HCT 32.2 10/02/2021    MCV 88.5 10/02/2021    MCH 29.9 10/02/2021    MCHC 33.9 10/02/2021    RDW 14.7 10/02/2021     10/02/2021    MPV 10.1 10/02/2021     CMP:    Lab Results   Component Value Date     07/27/2021    K 3.8 07/27/2021    K 4.5 05/09/2019     07/27/2021 CO2 23 07/27/2021    BUN 8 07/27/2021    CREATININE 0.6 07/27/2021    GFRAA >60 07/27/2021    LABGLOM >60 07/27/2021    GLUCOSE 92 07/27/2021    PROT 5.8 07/27/2021    LABALBU 3.2 07/27/2021    CALCIUM 8.5 07/27/2021    BILITOT 0.3 07/27/2021    ALKPHOS 78 07/27/2021    AST 15 07/27/2021    ALT 18 07/27/2021     U/A:    Lab Results   Component Value Date    COLORU Yellow 07/27/2021    PHUR 7.0 07/27/2021    WBCUA 0-1 07/27/2021    RBCUA NONE 07/27/2021    RBCUA 2-5 04/18/2013    BACTERIA MODERATE 07/27/2021    CLARITYU Clear 07/27/2021    SPECGRAV 1.015 07/27/2021    LEUKOCYTESUR TRACE 07/27/2021    UROBILINOGEN 0.2 07/27/2021    BILIRUBINUR Negative 07/27/2021    BLOODU Negative 07/27/2021    GLUCOSEU neg 09/13/2021    GLUCOSEU Negative 07/27/2021    AMORPHOUS MODERATE 06/16/2021          Assessment:     38 weeks and 1 day pregnancy  Dichorionic diamniotic twin gestation with vertex, vertex presentation and concordant growth  Positive GBS    Plan:     Expectant vaginal delivery under double set up in the operating room  Penicillin G prophylaxis    Virginia Matson MD,MD,10/2/2021 9:42 AM

## 2021-10-02 NOTE — PROGRESS NOTES
approx 38.1 weeks, presents for scheduled twin induction, patient states having some mild ctx, denies lof or vb, states positive fetal movement for baby A and B, states baby A cleft lip, states positive GBS, states both babies were vertex on Wednesday and was dilated 2cm, placed on efm.

## 2021-10-02 NOTE — FLOWSHEET NOTE
Patient admitted into room and oriented to surroundings. Introduced self and wrote this RN's name and phone extension on patient's white board. Phone and nurse's call light at patient's bedside and instructed to use for any needs. Patient instructed on new admission informational packet at bedside with information on infant testing to be done when infant is 24 hours old including [address] labs, 24 hours blood sugar, and CCHD. Patient instructed on mom baby unit policies and procedures including need to keep infant in bassinet for transport in hallways and for infant to sleep alone, on back, in an empty bassinet. Patient also instructed patient on unit visitation policy and that one same support person 25years old or older may stay overnight if desired. Patient verbalized understanding of all of the above. Pt. Refuses tdap at this time.

## 2021-10-02 NOTE — L&D DELIVERY NOTE
Department of Obstetrics and Gynecology  Spontaneous Vaginal Delivery Note         Pre-operative Diagnosis:  Term pregnancy, Induced labor and Pregnancy complicated by: Dichorionic diamniotic twin gestation with concordant growth and vertex/vertex presentation    Post-operative Diagnosis:  Same + baby A live male wt 6 #,1 oz, and baby B live male wt 6 #,3 oz with Apgars of 9 and 9 for both babies  procedure:  Spontaneous vaginal delivery    Surgeon:  Suzi Carter MD    Anesthesia:  epidural anesthesia    Estimated blood loss: 200    Specimen:  Placenta sent to pathology     Cord blood sent Yes    Complications:  none    Condition:  infants stable to general nursery and mother stable. Baby A has a cleft lip and palate    Details of Procedure: The patient is a 31 y.o. female at 38w1d   OB History    This patient's OB History needs to be verified. Open OB History to review and resolve any issues.    4    Para   3    Term   3            AB        Living   3       SAB        TAB        Ectopic        Molar        Multiple   0    Live Births   3             who was admitted for early latent labor for Pitocin induction secondary to twin gestation at 37 weeks. . She received the following interventions: ARBOW and IV Pitocin induction She was known to be GBS positive and did receive antibiotic prophylaxis. The patient progressed well,did receive an epidural, became complete and started to push. After pushing for 2 x the fetal head of twin A was at the perineum, nose and mouth suctioned with bulb suction and the rest of the infant delivered atraumatically, placed on mother abdomen. Cord was clamped and cut and infant handed off to the waiting nurse for evaluation. Next, bedside ultrasound was performed and baby B was confirmed to be also in the vertex presentation. Electronic fetal monitoring confirmed normal fetal heart tones and variability.   Baby slowly descended through the birth canal and artificial rupture of membranes was performed. Clear amniotic fluid was noted. The second baby was delivered without any problems. Cord blood was saved and the baby was attended to by the NICU team.  The delivery of the placenta was spontaneous. The placenta was then labeled and submitted to pathology. The perineum and vagina were explored and no tears or lacerations were noted.

## 2021-10-02 NOTE — LACTATION NOTE
Set mom up to pump with an ebp to provide pumped breast milk for twin with cleft lip and palate. Parents brought their own Dr. Farida Teran bottles from home. Mom reports Baby B nursed well. Encouraged mom to try to pump every three hours for 15-20 minutes to provide colostrum for baby A. Support provided and encouraged to call with any needs. Mom has a breast pump for home use.

## 2021-10-02 NOTE — ANESTHESIA PROCEDURE NOTES
Epidural Block    Patient location during procedure: OB  Start time: 10/2/2021 8:54 AM  End time: 10/2/2021 9:11 AM  Reason for block: labor epidural  Staffing  Performed: other anesthesia staff   Anesthesiologist: Damian Holder DO  Resident/CRNA: Pepper Busch APRN - JESSICA  Other anesthesia staff: Ivy Harden RN  Preanesthetic Checklist  Completed: patient identified, IV checked, site marked, risks and benefits discussed, surgical consent, monitors and equipment checked, pre-op evaluation, timeout performed, anesthesia consent given, oxygen available and patient being monitored  Epidural  Patient position: sitting  Prep: ChloraPrep  Patient monitoring: cardiac monitor, continuous pulse ox and frequent blood pressure checks  Approach: midline  Location: lumbar (1-5)  Injection technique: CHANA air  Provider prep: mask and sterile gloves  Needle  Needle type: Tuohy   Needle gauge: 18 G  Needle length: 2.5 in  Needle insertion depth: 7 cm  Catheter type: end hole  Catheter size: 20 G.   Catheter at skin depth: 13 cm  Test dose: negative  Assessment  Hemodynamics: stable  Attempts: 1

## 2021-10-03 LAB
FETAL SCREEN: NORMAL
HCT VFR BLD CALC: 31.2 % (ref 34–48)
HEMOGLOBIN: 10.6 G/DL (ref 11.5–15.5)
RHIG LOT NUMBER: NORMAL

## 2021-10-03 PROCEDURE — 85014 HEMATOCRIT: CPT

## 2021-10-03 PROCEDURE — 85461 HEMOGLOBIN FETAL: CPT

## 2021-10-03 PROCEDURE — 6360000002 HC RX W HCPCS: Performed by: OBSTETRICS & GYNECOLOGY

## 2021-10-03 PROCEDURE — 1220000000 HC SEMI PRIVATE OB R&B

## 2021-10-03 PROCEDURE — 96372 THER/PROPH/DIAG INJ SC/IM: CPT

## 2021-10-03 PROCEDURE — 6370000000 HC RX 637 (ALT 250 FOR IP): Performed by: OBSTETRICS & GYNECOLOGY

## 2021-10-03 PROCEDURE — 36415 COLL VENOUS BLD VENIPUNCTURE: CPT

## 2021-10-03 PROCEDURE — 85018 HEMOGLOBIN: CPT

## 2021-10-03 RX ORDER — IBUPROFEN 800 MG/1
800 TABLET ORAL EVERY 6 HOURS PRN
Qty: 120 TABLET | Refills: 0 | Status: SHIPPED | OUTPATIENT
Start: 2021-10-03 | End: 2022-06-27 | Stop reason: ALTCHOICE

## 2021-10-03 RX ADMIN — IBUPROFEN 800 MG: 800 TABLET, FILM COATED ORAL at 06:08

## 2021-10-03 RX ADMIN — OXYCODONE AND ACETAMINOPHEN 2 TABLET: 5; 325 TABLET ORAL at 06:34

## 2021-10-03 RX ADMIN — OXYCODONE AND ACETAMINOPHEN 2 TABLET: 5; 325 TABLET ORAL at 22:36

## 2021-10-03 RX ADMIN — OXYCODONE AND ACETAMINOPHEN 1 TABLET: 5; 325 TABLET ORAL at 11:25

## 2021-10-03 RX ADMIN — HUMAN RHO(D) IMMUNE GLOBULIN 300 MCG: 300 INJECTION, SOLUTION INTRAMUSCULAR at 13:56

## 2021-10-03 RX ADMIN — DOCUSATE SODIUM 100 MG: 100 CAPSULE ORAL at 08:29

## 2021-10-03 RX ADMIN — IBUPROFEN 800 MG: 800 TABLET, FILM COATED ORAL at 12:32

## 2021-10-03 RX ADMIN — DOCUSATE SODIUM 100 MG: 100 CAPSULE ORAL at 20:26

## 2021-10-03 RX ADMIN — IBUPROFEN 800 MG: 800 TABLET, FILM COATED ORAL at 18:53

## 2021-10-03 ASSESSMENT — PAIN DESCRIPTION - LOCATION: LOCATION: BACK

## 2021-10-03 ASSESSMENT — PAIN SCALES - GENERAL
PAINLEVEL_OUTOF10: 3
PAINLEVEL_OUTOF10: 10
PAINLEVEL_OUTOF10: 2
PAINLEVEL_OUTOF10: 7
PAINLEVEL_OUTOF10: 6
PAINLEVEL_OUTOF10: 5

## 2021-10-03 ASSESSMENT — PAIN DESCRIPTION - ORIENTATION: ORIENTATION: LOWER

## 2021-10-03 ASSESSMENT — PAIN DESCRIPTION - DESCRIPTORS: DESCRIPTORS: DISCOMFORT

## 2021-10-03 ASSESSMENT — PAIN DESCRIPTION - PAIN TYPE: TYPE: ACUTE PAIN

## 2021-10-03 ASSESSMENT — PAIN DESCRIPTION - PROGRESSION: CLINICAL_PROGRESSION: GRADUALLY WORSENING

## 2021-10-03 ASSESSMENT — PAIN DESCRIPTION - FREQUENCY: FREQUENCY: INTERMITTENT

## 2021-10-03 ASSESSMENT — PAIN DESCRIPTION - ONSET: ONSET: GRADUAL

## 2021-10-03 ASSESSMENT — PAIN - FUNCTIONAL ASSESSMENT: PAIN_FUNCTIONAL_ASSESSMENT: ACTIVITIES ARE NOT PREVENTED

## 2021-10-03 NOTE — FLOWSHEET NOTE
Dr. Barrie Murphy called, notified patient does not want to be discharged home today. New orders received.

## 2021-10-03 NOTE — ANESTHESIA POSTPROCEDURE EVALUATION
Department of Anesthesiology  Postprocedure Note    Patient: Bhavya Benedict  MRN: [id number]  YOB: 1995  Date of evaluation: 10/3/2021  Time:  8:46 AM     Procedure Summary     Date: 10/02/21 Room / Location:     Anesthesia Start: 0854 Anesthesia Stop: 1257    Procedure: Labor Analgesia Diagnosis:     Scheduled Providers:  Responsible Provider: Owen Franks DO    Anesthesia Type: general, spinal, epidural ASA Status: 2          Anesthesia Type: general, spinal, epidural    Nicolas Phase I:      Nicolas Phase II:      Last vitals: Reviewed and per EMR flowsheets.        Anesthesia Post Evaluation    Patient location during evaluation: bedside  Patient participation: complete - patient participated  Level of consciousness: awake  Pain score: 1  Airway patency: patent  Nausea & Vomiting: no nausea  Complications: no  Cardiovascular status: blood pressure returned to baseline  Respiratory status: acceptable  Hydration status: stable

## 2021-10-03 NOTE — DISCHARGE SUMMARY
Obstetrical Discharge Form         Patients Name  Buddy Martines    Gestational Age:  43w4d    Antepartum complications: twin gestation    Date of Delivery:        Tony Barker [[id number]]   10/2/2021      Arash Hoose Boy Georgina Carrollmer [[id number]]   10/2/2021              Omer, Baby Dong Akbar [[id number]]   12:52 PM      Omer, Baby Selene Ramirez [[id number]]   12:57 PM        Type of Delivery:        Arash Hoose Boy Chey [[id number]]   Vaginal, Spontaneous [250]      Arash Hoose Boy Georgina Christine [[id number]]   Vaginal, Spontaneous [250]     Rupture Date/time:    No data found      No data found     Presentation:         Arash Hoose Dong Akbar [[id number]]   Vertex [1]      Tony Barker [[id number]]   Vertex [1]     Position:        Arash Hoose Boy Chey [[id number]]         Tonytony Huanger [[id number]]                 Tony Barker [[id number]]         Tonytony Huanger [[id number]]                 Arash Hoose Boy Chey [[id number]]         Arash Hoose Boy Georgina Carrollmer [[id number]]          Anesthesia:         Tony Barker [[id number]]   Epidural [254]      Arash King [[id number]]   Epidural [254]       Feeding method:         Delivered By:        Tony Huanger [[id number]]   [address], [place] Murphy, Baby Boy Georgina King [[id number]]   STEVO-CATARINO ZAVALA       Baby:       Information for the patient's :  Tony Barker [[id number]]      Information for the patient's :  Tony Huanger [[id number]]          Intrapartum complications: None  Postpartum complications: none  Discharge Date:   10/4/21  Discharge Condition: Stable  Disposition:   Home    Plan:   Follow up    in 6 weeks

## 2021-10-03 NOTE — LACTATION NOTE
Mom reports baby B nursed well initially, supplementing with formula and now is having trouble latching. Encouraged mom to call with any latch assistance needed. Worked with Baby A with a shield to see if he could maintain an adequate latch on the shield, baby tried but had difficulty sucking. Encouraged mom to maintain pumping routine to stimulate production to provide colostrum. Baby is feeding well with Dr. Effie Arreola special feeding system. Support provided and encouraged to call with any needs.

## 2021-10-03 NOTE — DISCHARGE SUMMARY
[address]                               DISCHARGE SUMMARY    PATIENT NAME: Lisa Golden                      :        1995  MED REC NO:   [id number]                            ROOM:       [facility]  ACCOUNT NO:   [de-identified]                           ADMIT DATE: 2021. PROVIDER:     Adolfo Yip MD                  DISCHARGE DATE:  2021      ADMITTING DIAGNOSIS:  Intrauterine pregnancy at 37-1/2 weeks with twin  gestation with variable deceleration. DISCHARGE DIAGNOSIS:  Intrauterine pregnancy at 37-1/2 weeks with twin  gestation with variable deceleration. PROCEDURE PERFORMED:  Monitorin; biophysical profile. HOSPITAL COURSE IN DETAIL:  The patient underwent a biophysical profile,  which was 8/8 for both twins with adequate amniotic fluid noted. Both  infants were vertex. She had irregular contractions. Option of  continued observation in-house versus followup at the office the next  day was reviewed with her. She decided to go home and follow up at the  office the next day. She was therefore discharged home with fetal  movement, and labor precautions, and instructions to follow up at the  office the next day. She indicated understanding of all instructions.         Jose Miguel Peters MD    D: 10/02/2021 19:08:17       T: 10/02/2021 19:10:43     [id number]  Job#: [id number]     Doc#: [id number]    CC:

## 2021-10-04 VITALS
BODY MASS INDEX: 33.66 KG/M2 | DIASTOLIC BLOOD PRESSURE: 61 MMHG | OXYGEN SATURATION: 99 % | HEART RATE: 103 BPM | HEIGHT: 63 IN | TEMPERATURE: 98.2 F | WEIGHT: 190 LBS | SYSTOLIC BLOOD PRESSURE: 106 MMHG | RESPIRATION RATE: 18 BRPM

## 2021-10-04 PROCEDURE — 6370000000 HC RX 637 (ALT 250 FOR IP): Performed by: OBSTETRICS & GYNECOLOGY

## 2021-10-04 RX ADMIN — IBUPROFEN 800 MG: 800 TABLET, FILM COATED ORAL at 08:38

## 2021-10-04 RX ADMIN — DOCUSATE SODIUM 100 MG: 100 CAPSULE ORAL at 08:38

## 2021-10-04 ASSESSMENT — PAIN DESCRIPTION - RADICULAR PAIN: RADICULAR_PAIN: ABSENT

## 2021-10-04 ASSESSMENT — PAIN SCALES - GENERAL: PAINLEVEL_OUTOF10: 3

## 2021-10-04 NOTE — FLOWSHEET NOTE
Patient discharged to home in stable condition via wheelchair carrying infant on her lap in car seat. Patient and infant accompanied by father of the infant.

## 2021-10-04 NOTE — PROGRESS NOTES
CLINICAL PHARMACY NOTE: MEDS TO BEDS    Total # of Prescriptions Filled: 1   The following medications were delivered to the patient:  · ibu 800    Additional Documentation:

## 2021-10-04 NOTE — PLAN OF CARE
Problem: Pain:  Goal: Pain level will decrease  Description: Pain level will decrease  10/4/2021 0948 by Quintin Horne RN  Outcome: Met This Shift     Problem: Fluid Volume - Imbalance:  Goal: Absence of imbalanced fluid volume signs and symptoms  Description: Absence of imbalanced fluid volume signs and symptoms  10/4/2021 0948 by Quintin Horne RN  Outcome: Met This Shift     Problem: Fluid Volume - Imbalance:  Goal: Absence of postpartum hemorrhage signs and symptoms  Description: Absence of postpartum hemorrhage signs and symptoms  10/4/2021 0948 by Quintin Horne RN  Outcome: Met This Shift     Problem: Infection - Risk of, Puerperal Infection:  Goal: Will show no infection signs and symptoms  Description: Will show no infection signs and symptoms  10/4/2021 0948 by Quintin Horne RN  Outcome: Met This Shift     Problem: Mood - Altered:  Goal: Mood stable  Description: Mood stable  10/4/2021 0948 by Quintin Horne RN  Outcome: Met This Shift     Problem: Pain - Acute:  Goal: Pain level will decrease  Description: Pain level will decrease  10/4/2021 0948 by Quintin Horne RN  Outcome: Met This Shift

## 2021-10-04 NOTE — FLOWSHEET NOTE
Final ID band check completed with mother and infant. Correct ID confirmed. Hugs tag disabled and removed.

## 2021-10-04 NOTE — PLAN OF CARE
Problem: Pain:  Goal: Pain level will decrease  Description: Pain level will decrease  10/4/2021 0025 by Moe Horn RN  Outcome: Met This Shift     Problem: Pain:  Goal: Control of acute pain  Description: Control of acute pain  Outcome: Met This Shift     Problem: Pain:  Goal: Control of chronic pain  Description: Control of chronic pain  Outcome: Met This Shift     Problem: Discharge Planning:  Goal: Discharged to appropriate level of care  Description: Discharged to appropriate level of care  Outcome: Met This Shift     Problem: Constipation:  Goal: Bowel elimination is within specified parameters  Description: Bowel elimination is within specified parameters  Outcome: Met This Shift     Problem: Fluid Volume - Imbalance:  Goal: Absence of imbalanced fluid volume signs and symptoms  Description: Absence of imbalanced fluid volume signs and symptoms  10/4/2021 0025 by Moe Horn RN  Outcome: Met This Shift     Problem: Fluid Volume - Imbalance:  Goal: Absence of postpartum hemorrhage signs and symptoms  Description: Absence of postpartum hemorrhage signs and symptoms  Outcome: Met This Shift     Problem: Infection - Risk of, Puerperal Infection:  Goal: Will show no infection signs and symptoms  Description: Will show no infection signs and symptoms  10/4/2021 0025 by Moe Horn RN  Outcome: Met This Shift     Problem: Mood - Altered:  Goal: Mood stable  Description: Mood stable  10/4/2021 0025 by Moe Horn RN  Outcome: Met This Shift     Problem: Pain - Acute:  Goal: Pain level will decrease  Description: Pain level will decrease  10/4/2021 0025 by Moe Horn RN  Outcome: Met This Shift

## 2021-10-04 NOTE — FLOWSHEET NOTE
Patient instructed on discharge instructions with verbalized understanding. Patient signed discharge instructions and was given a copy for her records. Questions answered.

## 2021-10-31 NOTE — PROGRESS NOTES
Department of Obstetrics and Gynecology  Labor and Delivery  Attending Post Partum Progress Note      SUBJECTIVE:  Doing well with no complaints  OBJECTIVE:      Vitals:  /61   Pulse 103   Temp 98.2 °F (36.8 °C) (Oral)   Resp 18   Ht 5' 3\" (1.6 m)   Wt 190 lb (86.2 kg)   LMP 12/26/2020 (Exact Date)   SpO2 99%   Breastfeeding Unknown   BMI 33.66 kg/m²     ABDOMEN:  Soft, well contracted uterus   Lochia: Normal  No calf tenderness    DATA:    CBC:    Lab Results   Component Value Date    WBC 5.3 10/02/2021    RBC 3.64 10/02/2021    HGB 10.6 10/03/2021    HCT 31.2 10/03/2021    MCV 88.5 10/02/2021    RDW 14.7 10/02/2021     10/02/2021       ASSESSMENT & PLAN:      PPD # 1    Continue current care. Discharge home tomorrow.

## 2022-02-05 ENCOUNTER — HOSPITAL ENCOUNTER (EMERGENCY)
Age: 27
Discharge: HOME | End: 2022-02-05
Attending: EMERGENCY MEDICINE
Payer: MEDICAID

## 2022-02-05 VITALS
OXYGEN SATURATION: 99 % | HEART RATE: 108 BPM | SYSTOLIC BLOOD PRESSURE: 120 MMHG | RESPIRATION RATE: 18 BRPM | DIASTOLIC BLOOD PRESSURE: 71 MMHG | TEMPERATURE: 97.1 F | WEIGHT: 161 LBS | BODY MASS INDEX: 28.52 KG/M2

## 2022-02-05 DIAGNOSIS — J02.9 ACUTE PHARYNGITIS, UNSPECIFIED ETIOLOGY: Primary | ICD-10-CM

## 2022-02-05 PROCEDURE — 99283 EMERGENCY DEPT VISIT LOW MDM: CPT

## 2022-02-05 RX ORDER — AMOXICILLIN 250 MG/5ML
500 POWDER, FOR SUSPENSION ORAL 3 TIMES DAILY
Qty: 300 ML | Refills: 0 | Status: SHIPPED | OUTPATIENT
Start: 2022-02-05 | End: 2022-02-15

## 2022-02-05 RX ORDER — AMOXICILLIN 875 MG/1
875 TABLET, COATED ORAL 2 TIMES DAILY
Qty: 20 TABLET | Refills: 0 | Status: SHIPPED | OUTPATIENT
Start: 2022-02-05 | End: 2022-02-05

## 2022-02-05 ASSESSMENT — ENCOUNTER SYMPTOMS
ABDOMINAL PAIN: 0
SINUS CONGESTION: 0
COUGH: 0
BACK PAIN: 0
NAUSEA: 0
SHORTNESS OF BREATH: 0
BLOOD IN STOOL: 0
VOMITING: 0

## 2022-02-05 ASSESSMENT — PAIN DESCRIPTION - PROGRESSION: CLINICAL_PROGRESSION: GRADUALLY WORSENING

## 2022-02-05 ASSESSMENT — PAIN SCALES - GENERAL
PAINLEVEL_OUTOF10: 8
PAINLEVEL_OUTOF10: 8

## 2022-02-05 ASSESSMENT — PAIN DESCRIPTION - ONSET: ONSET: GRADUAL

## 2022-02-05 ASSESSMENT — PAIN DESCRIPTION - PAIN TYPE: TYPE: ACUTE PAIN

## 2022-02-05 ASSESSMENT — PAIN - FUNCTIONAL ASSESSMENT: PAIN_FUNCTIONAL_ASSESSMENT: 0-10

## 2022-02-05 ASSESSMENT — PAIN DESCRIPTION - FREQUENCY: FREQUENCY: CONTINUOUS

## 2022-02-05 ASSESSMENT — PAIN DESCRIPTION - LOCATION: LOCATION: THROAT

## 2022-02-05 ASSESSMENT — PAIN DESCRIPTION - DESCRIPTORS: DESCRIPTORS: SORE

## 2022-02-05 NOTE — ED PROVIDER NOTES
The history is provided by the patient. Pharyngitis  Location:  Generalized  Quality:  Aching  Severity:  Moderate  Onset quality:  Gradual  Duration:  2 days  Chronicity:  New  Associated symptoms: adenopathy    Associated symptoms: no abdominal pain, no chest pain, no chills, no cough, no fever, no headaches, no rash, no shortness of breath and no sinus congestion         Review of Systems   Constitutional: Negative for chills and fever. Respiratory: Negative for cough and shortness of breath. Cardiovascular: Negative for chest pain. Gastrointestinal: Negative for abdominal pain, blood in stool, nausea and vomiting. Genitourinary: Negative for dysuria and frequency. Musculoskeletal: Negative for back pain. Skin: Negative for rash. Neurological: Negative for weakness and headaches. Hematological: Positive for adenopathy. All other systems reviewed and are negative. Physical Exam  Vitals and nursing note reviewed. Constitutional:       Appearance: She is well-developed. HENT:      Head: Normocephalic and atraumatic. Right Ear: Hearing, tympanic membrane and external ear normal.      Left Ear: Hearing, tympanic membrane and external ear normal.      Nose: Nose normal.      Mouth/Throat:      Pharynx: Uvula midline. Pharyngeal swelling, oropharyngeal exudate and posterior oropharyngeal erythema present. Eyes:      General: Lids are normal.      Conjunctiva/sclera: Conjunctivae normal.      Pupils: Pupils are equal, round, and reactive to light. Cardiovascular:      Rate and Rhythm: Normal rate and regular rhythm. Heart sounds: Normal heart sounds. No murmur heard. Pulmonary:      Effort: Pulmonary effort is normal. No respiratory distress. Breath sounds: Normal breath sounds. No wheezing or rales. Abdominal:      General: Bowel sounds are normal.      Palpations: Abdomen is soft. Abdomen is not rigid. Tenderness: There is no abdominal tenderness.  There is no guarding or rebound. Musculoskeletal:      Cervical back: Normal range of motion and neck supple. Skin:     General: Skin is warm and dry. Findings: No abrasion or rash. Neurological:      Mental Status: She is alert and oriented to person, place, and time. GCS: GCS eye subscore is 4. GCS verbal subscore is 5. GCS motor subscore is 6. Cranial Nerves: No cranial nerve deficit. Sensory: No sensory deficit. Coordination: Coordination normal.      Gait: Gait normal.          Procedures     MDM              --------------------------------------------- PAST HISTORY ---------------------------------------------  Past Medical History:  has a past medical history of Anemia, Asthma, Endometriosis, Postpartum depression, and Rh incompatibility. Past Surgical History:  has a past surgical history that includes Eye surgery (1997). Social History:  reports that she quit smoking about 14 months ago. Her smoking use included cigarettes. She has never used smokeless tobacco. She reports previous drug use. Drug: Marijuana Morelia Coho). She reports that she does not drink alcohol. Family History: family history includes Arthritis in her maternal grandmother; Asthma in her maternal grandmother; Cancer in her maternal grandfather; Diabetes in her paternal grandfather; High Blood Pressure in her maternal grandfather; High Cholesterol in her maternal grandmother and paternal grandfather; Nathalie Hernández / [place] in her maternal grandmother and mother; Stroke in her maternal grandfather and maternal grandmother; Substance Abuse in her maternal grandfather. The patients home medications have been reviewed. Allergies: Patient has no known allergies. -------------------------------------------------- RESULTS -------------------------------------------------  Labs:  No results found for this visit on 02/05/22.     Radiology:  No orders to display       ------------------------- NURSING NOTES AND VITALS REVIEWED ---------------------------  Date / Time Roomed:  2/5/2022  9:23 AM  ED Bed Assignment:  02/02    The nursing notes within the ED encounter and vital signs as below have been reviewed. /71   Pulse 108   Temp 97.1 °F (36.2 °C) (Temporal)   Resp 18   Wt 161 lb (73 kg)   LMP 01/22/2022   SpO2 99%   Breastfeeding No   BMI 28.52 kg/m²   Oxygen Saturation Interpretation: Normal      ------------------------------------------ PROGRESS NOTES ------------------------------------------  I have spoken with the patient and discussed todays results, in addition to providing specific details for the plan of care and counseling regarding the diagnosis and prognosis. Their questions are answered at this time and they are agreeable with the plan. I discussed at length with them reasons for immediate return here for re evaluation. They will followup with primary care by calling their office tomorrow. --------------------------------- ADDITIONAL PROVIDER NOTES ---------------------------------  At this time the patient is without objective evidence of an acute process requiring hospitalization or inpatient management. They have remained hemodynamically stable throughout their entire ED visit and are stable for discharge with outpatient follow-up. The plan has been discussed in detail and they are aware of the specific conditions for emergent return, as well as the importance of follow-up. New Prescriptions    AMOXICILLIN (AMOXIL) 250 MG/5ML SUSPENSION    Take 10 mLs by mouth 3 times daily for 10 days       Diagnosis:  1. Acute pharyngitis, unspecified etiology        Disposition:  Patient's disposition: Discharge to home  Patient's condition is stable.                 Aleena Butler MD  02/05/22 8550

## 2022-06-27 ENCOUNTER — HOSPITAL ENCOUNTER (EMERGENCY)
Age: 27
Discharge: HOME | End: 2022-06-27
Attending: STUDENT IN AN ORGANIZED HEALTH CARE EDUCATION/TRAINING PROGRAM
Payer: MEDICAID

## 2022-06-27 ENCOUNTER — APPOINTMENT (OUTPATIENT)
Dept: GENERAL RADIOLOGY | Age: 27
End: 2022-06-27
Payer: MEDICAID

## 2022-06-27 VITALS
OXYGEN SATURATION: 98 % | HEART RATE: 78 BPM | DIASTOLIC BLOOD PRESSURE: 71 MMHG | BODY MASS INDEX: 30.65 KG/M2 | RESPIRATION RATE: 16 BRPM | SYSTOLIC BLOOD PRESSURE: 103 MMHG | WEIGHT: 173 LBS | TEMPERATURE: 97.1 F

## 2022-06-27 DIAGNOSIS — S60.221A CONTUSION OF RIGHT HAND, INITIAL ENCOUNTER: Primary | ICD-10-CM

## 2022-06-27 PROCEDURE — 73130 X-RAY EXAM OF HAND: CPT

## 2022-06-27 PROCEDURE — 99283 EMERGENCY DEPT VISIT LOW MDM: CPT

## 2022-06-27 ASSESSMENT — ENCOUNTER SYMPTOMS
COLOR CHANGE: 0
VOMITING: 0
ABDOMINAL PAIN: 0
COUGH: 0
DIARRHEA: 0
BACK PAIN: 0
NAUSEA: 0
SHORTNESS OF BREATH: 0

## 2022-06-27 ASSESSMENT — PAIN - FUNCTIONAL ASSESSMENT
PAIN_FUNCTIONAL_ASSESSMENT: 0-10
PAIN_FUNCTIONAL_ASSESSMENT: NONE - DENIES PAIN

## 2022-06-27 ASSESSMENT — PAIN DESCRIPTION - ORIENTATION: ORIENTATION: RIGHT

## 2022-06-27 ASSESSMENT — PAIN SCALES - GENERAL
PAINLEVEL_OUTOF10: 6
PAINLEVEL_OUTOF10: 6

## 2022-06-27 ASSESSMENT — PAIN DESCRIPTION - ONSET: ONSET: SUDDEN

## 2022-06-27 ASSESSMENT — PAIN DESCRIPTION - LOCATION: LOCATION: FINGER (COMMENT WHICH ONE)

## 2022-06-27 ASSESSMENT — PAIN DESCRIPTION - PAIN TYPE: TYPE: ACUTE PAIN

## 2022-06-27 ASSESSMENT — PAIN DESCRIPTION - FREQUENCY: FREQUENCY: CONTINUOUS

## 2022-06-27 NOTE — ED PROVIDER NOTES
HPI   Patient presenting to emergency department for evaluation of right middle finger injury. Patient had a crush injury earlier today. Patient was hammering and some nails and let her younger son try to do the same he missed and had hit her finger with a hammer. She had some associated swelling to the distal tip of the right middle finger as well as some bruising. We will has some pain there but he is able to make a fist.  She denies any other injury. No visible lacerations. She is applying ice with improvement of pain. Review of Systems   Constitutional: Negative for chills and fever. HENT: Negative for congestion. Respiratory: Negative for cough and shortness of breath. Cardiovascular: Negative for chest pain. Gastrointestinal: Negative for abdominal pain, diarrhea, nausea and vomiting. Genitourinary: Negative for difficulty urinating, dysuria and hematuria. Musculoskeletal: Negative for back pain. Right middle finger injury   Skin: Negative for color change. All other systems reviewed and are negative. Physical Exam  Vitals and nursing note reviewed. Constitutional:       Appearance: Normal appearance. HENT:      Head: Normocephalic and atraumatic. Nose: Nose normal. No congestion. Mouth/Throat:      Mouth: Mucous membranes are moist.      Pharynx: Oropharynx is clear. Eyes:      Conjunctiva/sclera: Conjunctivae normal.      Pupils: Pupils are equal, round, and reactive to light. Cardiovascular:      Rate and Rhythm: Normal rate and regular rhythm. Pulses: Normal pulses. Radial pulses are 2+ on the right side and 2+ on the left side. Heart sounds: Normal heart sounds. Pulmonary:      Effort: Pulmonary effort is normal. No respiratory distress. Breath sounds: Normal breath sounds. Abdominal:      General: Bowel sounds are normal. There is no distension. Tenderness: There is no abdominal tenderness.    Musculoskeletal: General: Normal range of motion. Cervical back: Normal range of motion and neck supple. Comments: Distal tip of the right middle finger is mildly swollen there is ecchymosis to the fat pad. No subungual hematoma. No sausage digit. Patient able to make closed fist without difficulty. Skin:     General: Skin is warm and dry. Capillary Refill: Capillary refill takes less than 2 seconds. Comments: No open wounds or lacerations. Neurological:      General: No focal deficit present. Mental Status: She is alert. Comments: Ulnar, radial, median nerves are intact. Procedures     MDM   Patient here with injury to her right middle finger there is some ecchymosis no signs of compartment syndrome, no pain out of proportion. Sensation grossly intact. X-ray found to be negative here. Patient to continue symptomatic care ice, ibuprofen. She was counseled on signs of compartment syndrome and when to emergently return back to the hospital if needed. Patient understanding. She was counseled to follow-up with her primary care physician if symptoms are persisting in the next 10 to 14 days. ED Course as of 06/27/22 1710   Mon Jun 27, 2022   9147 I offered patient Tylenol she is declining medication. [FG]      ED Course User Index  [FG] Reji Councilman, DO          --------------------------------------------- PAST HISTORY ---------------------------------------------  Past Medical History:  has a past medical history of Anemia, Asthma, Endometriosis, Postpartum depression, and Rh incompatibility. Past Surgical History:  has a past surgical history that includes Eye surgery (1997). Social History:  reports that she quit smoking about 18 months ago. Her smoking use included cigarettes. She has never used smokeless tobacco. She reports previous drug use. Drug: Marijuana Curlie Opal). She reports that she does not drink alcohol.     Family History: family history includes Arthritis in her maternal grandmother; Asthma in her maternal grandmother; Cancer in her maternal grandfather; Diabetes in her paternal grandfather; High Blood Pressure in her maternal grandfather; High Cholesterol in her maternal grandmother and paternal grandfather; Paticia Perks / [place] in her maternal grandmother and mother; Stroke in her maternal grandfather and maternal grandmother; Substance Abuse in her maternal grandfather. The patients home medications have been reviewed. Allergies: Patient has no known allergies. -------------------------------------------------- RESULTS -------------------------------------------------  Labs:  No results found for this visit on 06/27/22. Radiology:  XR HAND RIGHT (MIN 3 VIEWS)   Final Result   Soft tissue swelling to the distal aspect of the right long finger. No acute   osseous findings seen in this area nor about the right hand. Normal   alignment. RECOMMENDATION:   In the setting of trauma, if there is persistent symptoms and physical exam   warrants a repeat radiograph in 10-14 days could be considered as occult   fractures may not be evident on initial imaging evaluation.             ------------------------- NURSING NOTES AND VITALS REVIEWED ---------------------------  Date / Time Roomed:  6/27/2022  4:29 PM  ED Bed Assignment:  06/06    The nursing notes within the ED encounter and vital signs as below have been reviewed. /71   Pulse 78   Temp 97.1 °F (36.2 °C) (Temporal)   Resp 16   Wt 173 lb (78.5 kg)   LMP 04/27/2022   SpO2 98%   BMI 30.65 kg/m²   Oxygen Saturation Interpretation: Normal    --------------------------------- ADDITIONAL PROVIDER NOTES ---------------------------------  At this time the patient is without objective evidence of an acute process requiring hospitalization or inpatient management. They have remained hemodynamically stable throughout their entire ED visit and are stable for discharge with outpatient follow-up. The plan has been discussed in detail and they are aware of the specific conditions for emergent return, as well as the importance of follow-up. New Prescriptions    No medications on file       Diagnosis:  1. Contusion of right hand, initial encounter        Disposition:  Patient's disposition: Discharge to home  Patient's condition is stable.        Zeenat Langston DO  Resident  06/27/22 8049

## 2022-10-22 ENCOUNTER — HOSPITAL ENCOUNTER (EMERGENCY)
Age: 27
Discharge: HOME | End: 2022-10-22
Attending: EMERGENCY MEDICINE
Payer: MEDICAID

## 2022-10-22 VITALS
DIASTOLIC BLOOD PRESSURE: 73 MMHG | WEIGHT: 165 LBS | RESPIRATION RATE: 16 BRPM | HEART RATE: 100 BPM | SYSTOLIC BLOOD PRESSURE: 100 MMHG | TEMPERATURE: 97.1 F | OXYGEN SATURATION: 98 % | HEIGHT: 65 IN | BODY MASS INDEX: 27.49 KG/M2

## 2022-10-22 DIAGNOSIS — J01.90 ACUTE SINUSITIS, RECURRENCE NOT SPECIFIED, UNSPECIFIED LOCATION: Primary | ICD-10-CM

## 2022-10-22 LAB — SARS-COV-2, NAAT: NOT DETECTED

## 2022-10-22 PROCEDURE — 99283 EMERGENCY DEPT VISIT LOW MDM: CPT

## 2022-10-22 PROCEDURE — 87635 SARS-COV-2 COVID-19 AMP PRB: CPT

## 2022-10-22 RX ORDER — AMOXICILLIN AND CLAVULANATE POTASSIUM 875; 125 MG/1; MG/1
1 TABLET, FILM COATED ORAL 2 TIMES DAILY
Qty: 20 TABLET | Refills: 0 | Status: SHIPPED | OUTPATIENT
Start: 2022-10-22 | End: 2022-11-01

## 2022-10-22 RX ORDER — BROMPHENIRAMINE MALEATE, PSEUDOEPHEDRINE HYDROCHLORIDE, AND DEXTROMETHORPHAN HYDROBROMIDE 2; 30; 10 MG/5ML; MG/5ML; MG/5ML
5 SYRUP ORAL 4 TIMES DAILY PRN
Qty: 120 ML | Refills: 0 | Status: SHIPPED | OUTPATIENT
Start: 2022-10-22

## 2022-10-22 ASSESSMENT — ENCOUNTER SYMPTOMS
RHINORRHEA: 1
DIARRHEA: 0
WHEEZING: 0
NAUSEA: 0
SHORTNESS OF BREATH: 0
BACK PAIN: 0
EYE REDNESS: 0
SORE THROAT: 0
COUGH: 1
SINUS PRESSURE: 0
EYE PAIN: 0
ABDOMINAL DISTENTION: 0
ABDOMINAL PAIN: 0
EYE DISCHARGE: 0
BLOOD IN STOOL: 0
VOMITING: 0

## 2022-10-22 ASSESSMENT — PAIN DESCRIPTION - PAIN TYPE: TYPE: ACUTE PAIN

## 2022-10-22 ASSESSMENT — PAIN DESCRIPTION - ONSET: ONSET: ON-GOING

## 2022-10-22 ASSESSMENT — PAIN DESCRIPTION - DESCRIPTORS: DESCRIPTORS: ACHING

## 2022-10-22 ASSESSMENT — PAIN - FUNCTIONAL ASSESSMENT: PAIN_FUNCTIONAL_ASSESSMENT: 0-10

## 2022-10-22 ASSESSMENT — PAIN DESCRIPTION - LOCATION: LOCATION: THROAT;FACE

## 2022-10-22 ASSESSMENT — PAIN DESCRIPTION - FREQUENCY: FREQUENCY: CONTINUOUS

## 2022-10-22 ASSESSMENT — PAIN SCALES - GENERAL: PAINLEVEL_OUTOF10: 7

## 2022-10-22 ASSESSMENT — PAIN DESCRIPTION - ORIENTATION: ORIENTATION: RIGHT

## 2022-10-22 NOTE — ED PROVIDER NOTES
The history is provided by the patient. URI  Presenting symptoms: congestion, cough, facial pain and rhinorrhea    Presenting symptoms: no ear pain, no fatigue, no fever and no sore throat    Severity:  Moderate  Onset quality:  Sudden  Duration:  2 days  Chronicity:  New  Associated symptoms: no arthralgias, no headaches and no wheezing       Review of Systems   Constitutional:  Negative for chills, fatigue and fever. HENT:  Positive for congestion and rhinorrhea. Negative for ear pain, sinus pressure and sore throat. Eyes:  Negative for pain, discharge and redness. Respiratory:  Positive for cough. Negative for shortness of breath and wheezing. Cardiovascular:  Negative for chest pain. Gastrointestinal:  Negative for abdominal distention, abdominal pain, blood in stool, diarrhea, nausea and vomiting. Genitourinary:  Negative for dysuria and frequency. Musculoskeletal:  Negative for arthralgias and back pain. Skin:  Negative for rash and wound. Neurological:  Negative for weakness and headaches. Hematological:  Negative for adenopathy. All other systems reviewed and are negative. Physical Exam  Vitals and nursing note reviewed. Constitutional:       Appearance: She is well-developed. HENT:      Head: Normocephalic and atraumatic. Right Ear: Hearing and external ear normal. Tympanic membrane is retracted. Left Ear: Hearing and external ear normal. Tympanic membrane is retracted. Nose: Mucosal edema, congestion and rhinorrhea present. Mouth/Throat:      Pharynx: Uvula midline. Eyes:      General: Lids are normal.      Conjunctiva/sclera: Conjunctivae normal.      Pupils: Pupils are equal, round, and reactive to light. Cardiovascular:      Rate and Rhythm: Normal rate and regular rhythm. Heart sounds: Normal heart sounds. No murmur heard. Pulmonary:      Effort: Pulmonary effort is normal. No respiratory distress.       Breath sounds: Normal breath allergies. -------------------------------------------------- RESULTS -------------------------------------------------  Labs:  Results for orders placed or performed during the hospital encounter of 10/22/22   COVID-19, Rapid    Specimen: Nasopharyngeal Swab   Result Value Ref Range    SARS-CoV-2, NAAT Not Detected Not Detected       Radiology:  No orders to display       ------------------------- NURSING NOTES AND VITALS REVIEWED ---------------------------  Date / Time Roomed:  10/22/2022 11:08 AM  ED Bed Assignment:  04/04    The nursing notes within the ED encounter and vital signs as below have been reviewed. /73   Pulse 100   Temp 97.1 °F (36.2 °C) (Temporal)   Resp 16   Ht 5' 5\" (1.651 m)   Wt 165 lb (74.8 kg)   SpO2 98%   BMI 27.46 kg/m²   Oxygen Saturation Interpretation: Normal      ------------------------------------------ PROGRESS NOTES ------------------------------------------  I have spoken with the patient and discussed todays results, in addition to providing specific details for the plan of care and counseling regarding the diagnosis and prognosis. Their questions are answered at this time and they are agreeable with the plan. I discussed at length with them reasons for immediate return here for re evaluation. They will followup with primary care by calling their office tomorrow. Medications - No data to display      --------------------------------- ADDITIONAL PROVIDER NOTES ---------------------------------  At this time the patient is without objective evidence of an acute process requiring hospitalization or inpatient management. They have remained hemodynamically stable throughout their entire ED visit and are stable for discharge with outpatient follow-up. The plan has been discussed in detail and they are aware of the specific conditions for emergent return, as well as the importance of follow-up.       New Prescriptions    AMOXICILLIN-CLAVULANATE (AUGMENTIN) 875-125 MG PER TABLET    Take 1 tablet by mouth 2 times daily for 10 days    BROMPHENIRAMINE-PSEUDOEPHEDRINE-DM 2-30-10 MG/5ML SYRUP    Take 5 mLs by mouth 4 times daily as needed for Cough or Congestion       Diagnosis:  1. Acute sinusitis, recurrence not specified, unspecified location        Disposition:  Patient's disposition: Discharge to home  Patient's condition is stable.                     Yana Negron MD  10/22/22 2767

## 2023-03-06 NOTE — PROGRESS NOTES
SVE 7/100/+1. Patient brought to the OR for delivery. OR set up. Patient remains stable, no signs of distress noted. A&O x1 O2 sat 97% on non rebreather

## 2023-05-08 ENCOUNTER — APPOINTMENT (OUTPATIENT)
Dept: CT IMAGING | Age: 28
End: 2023-05-08
Payer: MEDICAID

## 2023-05-08 ENCOUNTER — APPOINTMENT (OUTPATIENT)
Dept: GENERAL RADIOLOGY | Age: 28
End: 2023-05-08
Payer: MEDICAID

## 2023-05-08 ENCOUNTER — HOSPITAL ENCOUNTER (EMERGENCY)
Age: 28
Discharge: HOME | End: 2023-05-09
Attending: EMERGENCY MEDICINE
Payer: MEDICAID

## 2023-05-08 DIAGNOSIS — R07.89 ATYPICAL CHEST PAIN: Primary | ICD-10-CM

## 2023-05-08 LAB
ANION GAP SERPL CALCULATED.3IONS-SCNC: 13 MMOL/L (ref 7–16)
BASOPHILS # BLD: 0.04 E9/L (ref 0–0.2)
BASOPHILS NFR BLD: 0.4 % (ref 0–2)
BUN SERPL-MCNC: 11 MG/DL (ref 6–20)
CALCIUM SERPL-MCNC: 8.9 MG/DL (ref 8.6–10.2)
CHLORIDE SERPL-SCNC: 109 MMOL/L (ref 98–107)
CO2 SERPL-SCNC: 20 MMOL/L (ref 22–29)
CREAT SERPL-MCNC: 0.7 MG/DL (ref 0.5–1)
D DIMER: 529 NG/ML DDU
EOSINOPHIL # BLD: 0.26 E9/L (ref 0.05–0.5)
EOSINOPHIL NFR BLD: 2.9 % (ref 0–6)
ERYTHROCYTE [DISTWIDTH] IN BLOOD BY AUTOMATED COUNT: 12.9 FL (ref 11.5–15)
GLUCOSE SERPL-MCNC: 102 MG/DL (ref 74–99)
HCG, URINE, POC: NEGATIVE
HCT VFR BLD AUTO: 36.7 % (ref 34–48)
HGB BLD-MCNC: 12.5 G/DL (ref 11.5–15.5)
IMM GRANULOCYTES # BLD: 0.02 E9/L
IMM GRANULOCYTES NFR BLD: 0.2 % (ref 0–5)
LYMPHOCYTES # BLD: 2.31 E9/L (ref 1.5–4)
LYMPHOCYTES NFR BLD: 25.4 % (ref 20–42)
Lab: NORMAL
MCH RBC QN AUTO: 28.7 PG (ref 26–35)
MCHC RBC AUTO-ENTMCNC: 34.1 % (ref 32–34.5)
MCV RBC AUTO: 84.4 FL (ref 80–99.9)
MONOCYTES # BLD: 0.59 E9/L (ref 0.1–0.95)
MONOCYTES NFR BLD: 6.5 % (ref 2–12)
NEGATIVE QC PASS/FAIL: NORMAL
NEUTROPHILS # BLD: 5.87 E9/L (ref 1.8–7.3)
NEUTS SEG NFR BLD: 64.6 % (ref 43–80)
PLATELET # BLD AUTO: 280 E9/L (ref 130–450)
PMV BLD AUTO: 9.8 FL (ref 7–12)
POSITIVE QC PASS/FAIL: NORMAL
POTASSIUM SERPL-SCNC: 3.4 MMOL/L (ref 3.5–5)
RBC # BLD AUTO: 4.35 E12/L (ref 3.5–5.5)
SODIUM SERPL-SCNC: 142 MMOL/L (ref 132–146)
TROPONIN, HIGH SENSITIVITY: <6 NG/L (ref 0–9)
WBC # BLD: 9.1 E9/L (ref 4.5–11.5)

## 2023-05-08 PROCEDURE — 99285 EMERGENCY DEPT VISIT HI MDM: CPT

## 2023-05-08 PROCEDURE — 85378 FIBRIN DEGRADE SEMIQUANT: CPT

## 2023-05-08 PROCEDURE — 6360000004 HC RX CONTRAST MEDICATION: Performed by: RADIOLOGY

## 2023-05-08 PROCEDURE — 84484 ASSAY OF TROPONIN QUANT: CPT

## 2023-05-08 PROCEDURE — 71275 CT ANGIOGRAPHY CHEST: CPT

## 2023-05-08 PROCEDURE — 93005 ELECTROCARDIOGRAM TRACING: CPT

## 2023-05-08 PROCEDURE — 80048 BASIC METABOLIC PNL TOTAL CA: CPT

## 2023-05-08 PROCEDURE — 85025 COMPLETE CBC W/AUTO DIFF WBC: CPT

## 2023-05-08 PROCEDURE — 71045 X-RAY EXAM CHEST 1 VIEW: CPT

## 2023-05-08 RX ADMIN — IOPAMIDOL 80 ML: 755 INJECTION, SOLUTION INTRAVENOUS at 23:39

## 2023-05-09 VITALS
HEART RATE: 93 BPM | RESPIRATION RATE: 25 BRPM | HEIGHT: 65 IN | TEMPERATURE: 98.6 F | WEIGHT: 168 LBS | SYSTOLIC BLOOD PRESSURE: 112 MMHG | OXYGEN SATURATION: 97 % | BODY MASS INDEX: 27.99 KG/M2 | DIASTOLIC BLOOD PRESSURE: 68 MMHG

## 2023-05-09 LAB
EKG ATRIAL RATE: 95 BPM
EKG P AXIS: 67 DEGREES
EKG P-R INTERVAL: 164 MS
EKG Q-T INTERVAL: 354 MS
EKG QRS DURATION: 76 MS
EKG QTC CALCULATION (BAZETT): 444 MS
EKG R AXIS: 18 DEGREES
EKG T AXIS: 29 DEGREES
EKG VENTRICULAR RATE: 95 BPM

## 2023-05-09 PROCEDURE — 93010 ELECTROCARDIOGRAM REPORT: CPT | Performed by: INTERNAL MEDICINE

## 2023-05-09 NOTE — ED PROVIDER NOTES
ATTENDING PROVIDER ATTESTATION:     Collette Nieto presented to the emergency department for evaluation of Chest Pain (PT stated chest pain started at 2130 today then followed by near syncope episode /)   and was initially evaluated by the Medical Resident. See Original ED Note for H&P and ED course above. I have reviewed and discussed the case, including pertinent history (medical, surgical, family and social) and exam findings with the Medical Resident assigned to Collette Nieto. I have personally performed and/or participated in the history, exam, medical decision making, and procedures and agree with all pertinent clinical information and any additional changes or corrections are noted below in my assessment and plan. I have discussed this patient in detail with the resident, and provided the instruction and education,       I have reviewed my findings and recommendations with the assigned Medical Resident, Collette Nieto and members of family present at the time of disposition. My Assessment/Plan: The following summarizes my clinical findings and independent assessment:     History:  Collette Nieto is a 33 y.o. female presenting to the ED for chest pain, beginning 30 minutes ago. The complaint has been persistent, mild in severity, and worsened by nothing. Patient has sharp chest pain with associated tingling of her hands and lips. Review of Systems:   A complete review of systems was performed and pertinent positives and negatives are stated within HPI, all other systems reviewed and are negative.    --------------------------------------------- PAST HISTORY ---------------------------------------------  Past Medical History:  has a past medical history of Anemia, Asthma, Endometriosis, Postpartum depression, and Rh incompatibility. Past Surgical History:  has a past surgical history that includes Eye surgery (1997).     Social History:  reports that she quit smoking about 2 years
ATTENDING PROVIDER ATTESTATION:     Neelam Ruggiero presented to the emergency department for evaluation of Chest Pain (PT stated chest pain started at 2130 today then followed by near syncope episode /)   and was initially evaluated by the Medical Resident. See Original ED Note for H&P and ED course above. I have reviewed and discussed the case, including pertinent history (medical, surgical, family and social) and exam findings with the Medical Resident assigned to Neelam Ruggiero. I have personally performed and/or participated in the history, exam, medical decision making, and procedures and agree with all pertinent clinical information and any additional changes or corrections are noted below in my assessment and plan. I have discussed this patient in detail with the resident, and provided the instruction and education,       I have reviewed my findings and recommendations with the assigned Medical Resident, Neelam Ruggiero and members of family present at the time of disposition. My Assessment/Plan: The following summarizes my clinical findings and independent assessment:     History:  Neelam Ruggiero is a 33 y.o. female presenting to the ED for chest pain, beginning 30 minutes ago. The complaint has been persistent, mild in severity, and worsened by nothing. Patient has sharp chest pain with associated tingling of her hands and lips. Review of Systems:   A complete review of systems was performed and pertinent positives and negatives are stated within HPI, all other systems reviewed and are negative.    --------------------------------------------- PAST HISTORY ---------------------------------------------  Past Medical History:  has a past medical history of Anemia, Asthma, Endometriosis, Postpartum depression, and Rh incompatibility. Past Surgical History:  has a past surgical history that includes Eye surgery (1997).     Social History:  reports that she quit smoking about 2 years
symptoms worsen      DISCHARGE MEDICATIONS:  Discharge Medication List as of 5/9/2023 12:24 AM               (Please note that portions of this note were completed with a voice recognition program.  Efforts were made to edit the dictations but occasionally words are mis-transcribed.)    Nathaniel Motta DO (electronically signed)       Nathaniel Motta DO  Resident  05/09/23 1110

## 2023-05-09 NOTE — ED NOTES
Pt resting on ED stretcher with no concerns/needs voiced. Pt has family member at bedside and denies any issues at this time.       Leverne Boxer, RN  05/08/23 6327

## 2023-05-09 NOTE — ED NOTES
Pt's PIV removed with catheter intact. PT given d/c instructions and was able to verbalize understanding. Pt ambulatory out of department.       Violeta Espana RN  05/09/23 2799

## 2023-10-08 ENCOUNTER — HOSPITAL ENCOUNTER (EMERGENCY)
Age: 28
Discharge: HOME | End: 2023-10-08
Attending: FAMILY MEDICINE
Payer: MEDICAID

## 2023-10-08 VITALS
TEMPERATURE: 98.4 F | SYSTOLIC BLOOD PRESSURE: 108 MMHG | RESPIRATION RATE: 16 BRPM | HEART RATE: 90 BPM | OXYGEN SATURATION: 98 % | DIASTOLIC BLOOD PRESSURE: 70 MMHG

## 2023-10-08 DIAGNOSIS — H61.23 BILATERAL IMPACTED CERUMEN: ICD-10-CM

## 2023-10-08 DIAGNOSIS — J06.9 ACUTE UPPER RESPIRATORY INFECTION: Primary | ICD-10-CM

## 2023-10-08 DIAGNOSIS — H72.92 PERFORATION OF LEFT TYMPANIC MEMBRANE: ICD-10-CM

## 2023-10-08 PROCEDURE — 99283 EMERGENCY DEPT VISIT LOW MDM: CPT

## 2023-10-08 PROCEDURE — 69200 CLEAR OUTER EAR CANAL: CPT

## 2023-10-08 PROCEDURE — 69209 REMOVE IMPACTED EAR WAX UNI: CPT

## 2023-10-08 RX ORDER — PREDNISONE 20 MG/1
40 TABLET ORAL DAILY
Qty: 8 TABLET | Refills: 0 | Status: SHIPPED | OUTPATIENT
Start: 2023-10-08 | End: 2023-10-12

## 2023-10-08 RX ORDER — BROMPHENIRAMINE MALEATE, PSEUDOEPHEDRINE HYDROCHLORIDE, AND DEXTROMETHORPHAN HYDROBROMIDE 2; 30; 10 MG/5ML; MG/5ML; MG/5ML
5 SYRUP ORAL 4 TIMES DAILY PRN
Qty: 120 ML | Refills: 0 | Status: SHIPPED | OUTPATIENT
Start: 2023-10-08

## 2023-10-08 RX ORDER — AMOXICILLIN AND CLAVULANATE POTASSIUM 875; 125 MG/1; MG/1
1 TABLET, FILM COATED ORAL 2 TIMES DAILY
Qty: 20 TABLET | Refills: 0 | Status: SHIPPED | OUTPATIENT
Start: 2023-10-08 | End: 2023-10-18

## 2023-10-08 RX ORDER — DEXTROMETHORPHAN HYDROBROMIDE AND PROMETHAZINE HYDROCHLORIDE 15; 6.25 MG/5ML; MG/5ML
5 SYRUP ORAL 4 TIMES DAILY PRN
Qty: 118 ML | Refills: 0 | Status: SHIPPED | OUTPATIENT
Start: 2023-10-08 | End: 2023-10-08

## 2023-10-08 RX ORDER — ALBUTEROL SULFATE 90 UG/1
2 AEROSOL, METERED RESPIRATORY (INHALATION) 4 TIMES DAILY PRN
Qty: 54 G | Refills: 1 | Status: SHIPPED | OUTPATIENT
Start: 2023-10-08

## 2023-10-08 RX ORDER — ECHINACEA PURPUREA EXTRACT 125 MG
1 TABLET ORAL PRN
Qty: 88 ML | Refills: 0 | Status: SHIPPED | OUTPATIENT
Start: 2023-10-08

## 2023-10-08 RX ORDER — AMOXICILLIN 500 MG/1
500 CAPSULE ORAL 3 TIMES DAILY
Qty: 30 CAPSULE | Refills: 0 | Status: SHIPPED | OUTPATIENT
Start: 2023-10-08 | End: 2023-10-08

## 2023-10-08 ASSESSMENT — PAIN - FUNCTIONAL ASSESSMENT: PAIN_FUNCTIONAL_ASSESSMENT: NONE - DENIES PAIN

## 2023-10-08 NOTE — ED NOTES
BL ears irrigated with copious amounts of warm water. Large amount of cerum removed from left ear. Small amount of cerum removed from right ear. Dr. Sonya Winters.      Arjun Pacheco RN  10/08/23 6342

## 2023-10-19 ENCOUNTER — HOSPITAL ENCOUNTER (EMERGENCY)
Age: 28
Discharge: HOME | End: 2023-10-19
Attending: EMERGENCY MEDICINE
Payer: MEDICAID

## 2023-10-19 VITALS
HEART RATE: 97 BPM | SYSTOLIC BLOOD PRESSURE: 111 MMHG | OXYGEN SATURATION: 98 % | DIASTOLIC BLOOD PRESSURE: 73 MMHG | RESPIRATION RATE: 16 BRPM | WEIGHT: 165 LBS | BODY MASS INDEX: 28.17 KG/M2 | HEIGHT: 64 IN | TEMPERATURE: 97.6 F

## 2023-10-19 DIAGNOSIS — J01.90 ACUTE SINUSITIS, RECURRENCE NOT SPECIFIED, UNSPECIFIED LOCATION: Primary | ICD-10-CM

## 2023-10-19 DIAGNOSIS — J20.9 ACUTE BRONCHITIS, UNSPECIFIED ORGANISM: ICD-10-CM

## 2023-10-19 DIAGNOSIS — T23.221A PARTIAL THICKNESS BURN OF FINGER OF RIGHT HAND, INITIAL ENCOUNTER: ICD-10-CM

## 2023-10-19 PROCEDURE — 99283 EMERGENCY DEPT VISIT LOW MDM: CPT

## 2023-10-19 RX ORDER — FLUTICASONE PROPIONATE 50 MCG
2 SPRAY, SUSPENSION (ML) NASAL DAILY
Qty: 16 G | Refills: 0 | Status: SHIPPED | OUTPATIENT
Start: 2023-10-19

## 2023-10-19 RX ORDER — BROMPHENIRAMINE MALEATE, PSEUDOEPHEDRINE HYDROCHLORIDE, AND DEXTROMETHORPHAN HYDROBROMIDE 2; 30; 10 MG/5ML; MG/5ML; MG/5ML
5 SYRUP ORAL 4 TIMES DAILY PRN
Qty: 120 ML | Refills: 0 | Status: SHIPPED | OUTPATIENT
Start: 2023-10-19

## 2023-10-19 RX ORDER — METHYLPREDNISOLONE 4 MG/1
TABLET ORAL
Qty: 1 KIT | Refills: 0 | Status: SHIPPED | OUTPATIENT
Start: 2023-10-19 | End: 2023-10-25

## 2023-10-19 RX ORDER — DOXYCYCLINE HYCLATE 100 MG
100 TABLET ORAL 2 TIMES DAILY
Qty: 20 TABLET | Refills: 0 | Status: SHIPPED | OUTPATIENT
Start: 2023-10-19 | End: 2023-10-29

## 2023-10-19 ASSESSMENT — ENCOUNTER SYMPTOMS
SHORTNESS OF BREATH: 0
BACK PAIN: 0
WHEEZING: 1
COUGH: 1
VOMITING: 0
DIARRHEA: 0
NAUSEA: 0
EYE PAIN: 0
BLOOD IN STOOL: 0
EYE REDNESS: 0
SORE THROAT: 0
SINUS PRESSURE: 0
ABDOMINAL PAIN: 0
ABDOMINAL DISTENTION: 0
RHINORRHEA: 1
EYE DISCHARGE: 0

## 2023-10-20 ENCOUNTER — TELEPHONE (OUTPATIENT)
Dept: ENT CLINIC | Age: 28
End: 2023-10-20

## 2023-10-20 NOTE — TELEPHONE ENCOUNTER
Pt called to schedule a ED follow up from [facility] ED-10/08/23 and also 10/20 - Aly Parker on call 10/08. Acute upper respiratory infection- Bilateral impacted cerumen-Perforation of left tympanic membrane. 10/20 -Acute sinusitis, recurrence not specified, unspecified location- Acute bronchitis, unspecified organism.  Please contact pt

## 2023-10-20 NOTE — TELEPHONE ENCOUNTER
Called pt and advised pt Dr. Susy Buckner was on call when she went to the emergency room. I gave pt his office number and advised her to call to get a appointment for her sinusitis. Pt understood.

## 2023-12-04 ENCOUNTER — HOSPITAL ENCOUNTER (EMERGENCY)
Age: 28
Discharge: HOME | End: 2023-12-04
Attending: FAMILY MEDICINE
Payer: MEDICAID

## 2023-12-04 VITALS
OXYGEN SATURATION: 100 % | RESPIRATION RATE: 16 BRPM | HEIGHT: 64 IN | WEIGHT: 165 LBS | HEART RATE: 76 BPM | SYSTOLIC BLOOD PRESSURE: 95 MMHG | BODY MASS INDEX: 28.17 KG/M2 | DIASTOLIC BLOOD PRESSURE: 62 MMHG | TEMPERATURE: 98 F

## 2023-12-04 DIAGNOSIS — U07.1 COVID-19 VIRUS INFECTION: Primary | ICD-10-CM

## 2023-12-04 LAB
INFLUENZA A BY PCR: NOT DETECTED
INFLUENZA B BY PCR: NOT DETECTED
SARS-COV-2 RDRP RESP QL NAA+PROBE: DETECTED
SPECIMEN DESCRIPTION: ABNORMAL

## 2023-12-04 PROCEDURE — 87502 INFLUENZA DNA AMP PROBE: CPT

## 2023-12-04 PROCEDURE — 99283 EMERGENCY DEPT VISIT LOW MDM: CPT

## 2023-12-04 PROCEDURE — 87635 SARS-COV-2 COVID-19 AMP PRB: CPT

## 2023-12-04 RX ORDER — PREDNISONE 20 MG/1
40 TABLET ORAL DAILY
Qty: 8 TABLET | Refills: 0 | Status: SHIPPED | OUTPATIENT
Start: 2023-12-04 | End: 2023-12-08

## 2023-12-04 RX ORDER — PSEUDOEPHEDRINE HYDROCHLORIDE 60 MG/1
60 TABLET, FILM COATED ORAL 3 TIMES DAILY PRN
Qty: 30 TABLET | Refills: 0 | Status: SHIPPED | OUTPATIENT
Start: 2023-12-04

## 2024-01-06 ENCOUNTER — HOSPITAL ENCOUNTER (EMERGENCY)
Age: 29
Discharge: HOME | End: 2024-01-06
Attending: EMERGENCY MEDICINE
Payer: MEDICAID

## 2024-01-06 VITALS
TEMPERATURE: 98.2 F | SYSTOLIC BLOOD PRESSURE: 104 MMHG | HEART RATE: 88 BPM | OXYGEN SATURATION: 97 % | RESPIRATION RATE: 16 BRPM | DIASTOLIC BLOOD PRESSURE: 68 MMHG

## 2024-01-06 DIAGNOSIS — K59.00 CONSTIPATION, UNSPECIFIED CONSTIPATION TYPE: Primary | ICD-10-CM

## 2024-01-06 PROCEDURE — 99283 EMERGENCY DEPT VISIT LOW MDM: CPT

## 2024-01-06 RX ORDER — POLYETHYLENE GLYCOL 3350 17 G/17G
17 POWDER ORAL DAILY
Qty: 116 G | Refills: 0 | Status: SHIPPED | OUTPATIENT
Start: 2024-01-06

## 2024-01-06 RX ORDER — ZINC GLUCONATE 50 MG
50 TABLET ORAL DAILY
COMMUNITY

## 2024-01-06 RX ORDER — ASCORBIC ACID 500 MG
500 TABLET ORAL DAILY
COMMUNITY

## 2024-01-06 ASSESSMENT — PAIN - FUNCTIONAL ASSESSMENT: PAIN_FUNCTIONAL_ASSESSMENT: NONE - DENIES PAIN

## 2024-01-06 NOTE — ED PROVIDER NOTES
Radiologist.  No orders to display       ------------------------- NURSING NOTES AND VITALS REVIEWED ---------------------------   The nursing notes within the ED encounter and vital signs as below have been reviewed.   /68   Pulse 88   Temp 98.2 °F (36.8 °C) (Temporal)   Resp 16   LMP 12/25/2023 (Exact Date)   SpO2 97%   Oxygen Saturation Interpretation: Normal      ---------------------------------------------------PHYSICAL EXAM--------------------------------------      Constitutional/General: Alert and oriented x3, well appearing, non toxic in NAD  Head: NC/AT  Eyes: PERRL, EOMI  Mouth: Oropharynx clear, handling secretions, no trismus  Neck: Supple, full ROM, no meningeal signs  Pulmonary: Lungs clear to auscultation bilaterally, no wheezes, rales, or rhonchi. Not in respiratory distress  Cardiovascular:  Regular rate and rhythm, no murmurs, gallops, or rubs. 2+ distal pulses  Abdomen: Soft, non tender, non distended,   Extremities: Moves all extremities x 4. Warm and well perfused  Skin: warm and dry without rash  Neurologic: GCS 15,  Psych: Normal Affect      ------------------------------ ED COURSE/MEDICAL DECISION MAKING----------------------  Medications - No data to display      Medical Decision Making:    I had a lengthy discussion with the patient regarding increasing fiber and fluid intake, along with taking MiraLAX on a more daily basis for the next few days including mixing with Gatorade to make it more palatable.  Follow-up with PCP.  Patient's Medications   New Prescriptions    POLYETHYLENE GLYCOL (MIRALAX) 17 GM/SCOOP POWD POWDER    Take 17 g by mouth daily   Previous Medications    VITAMIN C (ASCORBIC ACID) 500 MG TABLET    Take 1 tablet by mouth daily    ZINC GLUCONATE 50 MG TABLET    Take 1 tablet by mouth daily   Modified Medications    No medications on file   Discontinued Medications    BROMPHENIRAMINE-PSEUDOEPHEDRINE-DM 2-30-10 MG/5ML SYRUP    Take 5 mLs by mouth 4 times daily as  needed for Cough or Congestion    FLUTICASONE (FLONASE) 50 MCG/ACT NASAL SPRAY    2 sprays by Each Nostril route daily    PSEUDOEPHEDRINE (SUDAFED) 60 MG TABLET    Take 1 tablet by mouth 3 times daily as needed for Congestion    SILVER SULFADIAZINE (SILVADENE) 1 % CREAM    Apply topically daily.         Counseling:   The emergency provider has spoken with the patient and discussed today’s results, in addition to providing specific details for the plan of care and counseling regarding the diagnosis and prognosis.  Questions are answered at this time and they are agreeable with the plan.      --------------------------------- IMPRESSION AND DISPOSITION ---------------------------------    IMPRESSION  1. Constipation, unspecified constipation type        DISPOSITION  Disposition: Discharge to home  Patient condition is good                  Lee Stratton MD  01/06/24 2492

## 2024-03-18 ENCOUNTER — TRANSCRIBE ORDERS (OUTPATIENT)
Age: 29
End: 2024-03-18

## 2024-03-18 DIAGNOSIS — R00.2 PALPITATIONS: Primary | ICD-10-CM

## 2024-03-22 ENCOUNTER — HOSPITAL ENCOUNTER (EMERGENCY)
Age: 29
Discharge: HOME | End: 2024-03-22
Payer: MEDICAID

## 2024-03-22 VITALS
RESPIRATION RATE: 16 BRPM | TEMPERATURE: 98.5 F | OXYGEN SATURATION: 100 % | DIASTOLIC BLOOD PRESSURE: 68 MMHG | HEART RATE: 88 BPM | SYSTOLIC BLOOD PRESSURE: 108 MMHG

## 2024-03-22 DIAGNOSIS — R53.1 GENERALIZED WEAKNESS: Primary | ICD-10-CM

## 2024-03-22 LAB
ALBUMIN SERPL-MCNC: 4.3 G/DL (ref 3.5–5.2)
ALP SERPL-CCNC: 62 U/L (ref 35–104)
ALT SERPL-CCNC: 42 U/L (ref 0–32)
ANION GAP SERPL CALCULATED.3IONS-SCNC: 12 MMOL/L (ref 7–16)
AST SERPL-CCNC: 31 U/L (ref 0–31)
BASOPHILS # BLD: 0.05 K/UL (ref 0–0.2)
BASOPHILS NFR BLD: 1 % (ref 0–2)
BILIRUB SERPL-MCNC: 0.4 MG/DL (ref 0–1.2)
BILIRUB UR QL STRIP: NEGATIVE
BUN SERPL-MCNC: 11 MG/DL (ref 6–20)
CALCIUM SERPL-MCNC: 9.5 MG/DL (ref 8.6–10.2)
CHLORIDE SERPL-SCNC: 104 MMOL/L (ref 98–107)
CLARITY UR: CLEAR
CO2 SERPL-SCNC: 25 MMOL/L (ref 22–29)
COLOR UR: YELLOW
CREAT SERPL-MCNC: 0.8 MG/DL (ref 0.5–1)
EOSINOPHIL # BLD: 0.17 K/UL (ref 0.05–0.5)
EOSINOPHILS RELATIVE PERCENT: 2 % (ref 0–6)
EPI CELLS #/AREA URNS HPF: ABNORMAL /HPF
ERYTHROCYTE [DISTWIDTH] IN BLOOD BY AUTOMATED COUNT: 12.5 % (ref 11.5–15)
GFR SERPL CREATININE-BSD FRML MDRD: >60 ML/MIN/1.73M2
GLUCOSE SERPL-MCNC: 90 MG/DL (ref 74–99)
GLUCOSE UR STRIP-MCNC: NEGATIVE MG/DL
HCG, URINE, POC: NEGATIVE
HCT VFR BLD AUTO: 41.6 % (ref 34–48)
HGB BLD-MCNC: 14.1 G/DL (ref 11.5–15.5)
HGB UR QL STRIP.AUTO: NEGATIVE
IMM GRANULOCYTES # BLD AUTO: <0.03 K/UL (ref 0–0.58)
IMM GRANULOCYTES NFR BLD: 0 % (ref 0–5)
INFLUENZA A BY PCR: NOT DETECTED
INFLUENZA B BY PCR: NOT DETECTED
KETONES UR STRIP-MCNC: NEGATIVE MG/DL
LEUKOCYTE ESTERASE UR QL STRIP: ABNORMAL
LYMPHOCYTES NFR BLD: 1.35 K/UL (ref 1.5–4)
LYMPHOCYTES RELATIVE PERCENT: 18 % (ref 20–42)
Lab: NORMAL
MCH RBC QN AUTO: 28.8 PG (ref 26–35)
MCHC RBC AUTO-ENTMCNC: 33.9 G/DL (ref 32–34.5)
MCV RBC AUTO: 85.1 FL (ref 80–99.9)
MONOCYTES NFR BLD: 0.68 K/UL (ref 0.1–0.95)
MONOCYTES NFR BLD: 9 % (ref 2–12)
NEGATIVE QC PASS/FAIL: NORMAL
NEUTROPHILS NFR BLD: 71 % (ref 43–80)
NEUTS SEG NFR BLD: 5.44 K/UL (ref 1.8–7.3)
NITRITE UR QL STRIP: NEGATIVE
PH UR STRIP: 6 [PH] (ref 5–9)
PLATELET # BLD AUTO: 283 K/UL (ref 130–450)
PMV BLD AUTO: 9.7 FL (ref 7–12)
POSITIVE QC PASS/FAIL: NORMAL
POTASSIUM SERPL-SCNC: 4 MMOL/L (ref 3.5–5)
PROT SERPL-MCNC: 7.3 G/DL (ref 6.4–8.3)
PROT UR STRIP-MCNC: NEGATIVE MG/DL
RBC # BLD AUTO: 4.89 M/UL (ref 3.5–5.5)
RBC #/AREA URNS HPF: ABNORMAL /HPF
SARS-COV-2 RDRP RESP QL NAA+PROBE: NOT DETECTED
SODIUM SERPL-SCNC: 141 MMOL/L (ref 132–146)
SP GR UR STRIP: 1.01 (ref 1–1.03)
SPECIMEN DESCRIPTION: NORMAL
UROBILINOGEN UR STRIP-ACNC: 0.2 EU/DL (ref 0–1)
WBC #/AREA URNS HPF: ABNORMAL /HPF
WBC OTHER # BLD: 7.7 K/UL (ref 4.5–11.5)

## 2024-03-22 PROCEDURE — 81001 URINALYSIS AUTO W/SCOPE: CPT

## 2024-03-22 PROCEDURE — 2580000003 HC RX 258: Performed by: PHYSICIAN ASSISTANT

## 2024-03-22 PROCEDURE — 85025 COMPLETE CBC W/AUTO DIFF WBC: CPT

## 2024-03-22 PROCEDURE — 87502 INFLUENZA DNA AMP PROBE: CPT

## 2024-03-22 PROCEDURE — 87635 SARS-COV-2 COVID-19 AMP PRB: CPT

## 2024-03-22 PROCEDURE — 80053 COMPREHEN METABOLIC PANEL: CPT

## 2024-03-22 PROCEDURE — 99284 EMERGENCY DEPT VISIT MOD MDM: CPT

## 2024-03-22 RX ORDER — 0.9 % SODIUM CHLORIDE 0.9 %
500 INTRAVENOUS SOLUTION INTRAVENOUS ONCE
Status: COMPLETED | OUTPATIENT
Start: 2024-03-22 | End: 2024-03-22

## 2024-03-22 RX ADMIN — SODIUM CHLORIDE 500 ML: 9 INJECTION, SOLUTION INTRAVENOUS at 18:25

## 2024-03-22 NOTE — ED PROVIDER NOTES
NEGATIVE    WBC, UA 0 TO 5 0 TO 5 /HPF    RBC, UA 0 TO 2 0 TO 2 /HPF    Epithelial Cells UA 10 TO 20 /HPF   POC Pregnancy Urine Qual   Result Value Ref Range    HCG, Urine, POC Negative Negative    Lot Number [id number]     Positive QC Pass/Fail Acceptable     Negative QC Pass/Fail Acceptable      Imaging:  All Radiology results interpreted by Radiologist unless otherwise noted.  No orders to display       ED Course / Medical Decision Making     Medications   sodium chloride 0.9 % bolus 500 mL (0 mLs IntraVENous Stopped 3/22/24 1944)        Re-examination:  3/22/24       Time:   Patient’s condition .    Consult(s):   None    Procedure(s):   none    MDM:   ED COURSE / MEDICAL DECISION MAKING    Recap: 29-year-old with complaint of just generalized not feeling well with bodyaches and feeling like she is going to pass out  History was provided by patient and there no social determinants affecting patient care.  Records Reviewed: None  Results/Interventions:   CBC within normal limits  CMP within normal limits  UA negative for infection, positive for epithelial cells  Viral testing negative for influenza and negative for COVID  Patient treated with full liter of saline and she does feel better.    Differential Diagnoses considered but not fully inclusive: Viral syndrome.  Influenza.  COVID.  Dehydration.  Electrolyte imbalance.    I am Primary Clinician of Record and case was not discussed with ED Physician.    Diagnosis, Disposition and any Prescriptions as follows  All results discussed with patient at bedside.  Workup is essentially negative in the ER.  Vitals are stable.  Patient's been encouraged to follow back up with her PCP.    Plan of Care/Counseling:  AREN Ayala reviewed today's visit with the patient in addition to providing specific details for the plan of care and counseling regarding the diagnosis and prognosis.  Questions are answered at this time and are agreeable with the plan.    ASSESSMENT     1.

## 2024-03-25 ENCOUNTER — HOSPITAL ENCOUNTER (OUTPATIENT)
Age: 29
Discharge: HOME | End: 2024-03-27
Payer: MEDICAID

## 2024-03-25 DIAGNOSIS — R00.2 PALPITATIONS: ICD-10-CM

## 2024-03-25 PROCEDURE — 93225 XTRNL ECG REC<48 HRS REC: CPT

## 2024-05-22 NOTE — PLAN OF CARE
A pulse oximeter was placed on the patient's left index finger and left index finger. Problem: Fluid Volume - Imbalance:  Goal: Absence of imbalanced fluid volume signs and symptoms  Description: Absence of imbalanced fluid volume signs and symptoms  10/3/2021 1038 by Ranjit Perez RN  Outcome: Met This Shift     Problem: Infection - Risk of, Puerperal Infection:  Goal: Will show no infection signs and symptoms  Description: Will show no infection signs and symptoms  10/3/2021 1038 by Ranjit Perez RN  Outcome: Met This Shift     Problem: Mood - Altered:  Goal: Mood stable  Description: Mood stable  10/3/2021 1038 by Ranjit Perez RN  Outcome: Met This Shift     Problem: Pain:  Goal: Pain level will decrease  Description: Pain level will decrease  10/3/2021 1038 by Ranjit Perez RN  Outcome: Ongoing